# Patient Record
Sex: MALE | Race: WHITE | Employment: FULL TIME | URBAN - METROPOLITAN AREA
[De-identification: names, ages, dates, MRNs, and addresses within clinical notes are randomized per-mention and may not be internally consistent; named-entity substitution may affect disease eponyms.]

---

## 2017-02-07 ENCOUNTER — HOSPITAL ENCOUNTER (EMERGENCY)
Age: 37
Discharge: HOME | End: 2017-02-07
Attending: EMERGENCY MEDICINE
Payer: COMMERCIAL

## 2017-02-07 VITALS
BODY MASS INDEX: 19.64 KG/M2 | DIASTOLIC BLOOD PRESSURE: 82 MMHG | RESPIRATION RATE: 16 BRPM | WEIGHT: 145 LBS | HEIGHT: 72 IN | OXYGEN SATURATION: 98 % | TEMPERATURE: 98 F | SYSTOLIC BLOOD PRESSURE: 132 MMHG | HEART RATE: 74 BPM

## 2017-02-07 DIAGNOSIS — K29.20 ALCOHOLIC GASTRITIS WITHOUT BLEEDING, UNSPECIFIED CHRONICITY: Primary | ICD-10-CM

## 2017-02-07 LAB
ALBUMIN SERPL BCP-MCNC: 4.5 G/DL (ref 3.5–5)
ALBUMIN/GLOB SERPL: 1.3 {RATIO} (ref 1.2–3.5)
ALP SERPL-CCNC: 88 U/L (ref 50–136)
ALT SERPL-CCNC: 157 U/L (ref 12–65)
ANION GAP BLD CALC-SCNC: 25 MMOL/L (ref 7–16)
AST SERPL W P-5'-P-CCNC: 186 U/L (ref 15–37)
BASOPHILS # BLD AUTO: 0.1 K/UL (ref 0–0.2)
BASOPHILS # BLD: 1 % (ref 0–2)
BILIRUB SERPL-MCNC: 1.3 MG/DL (ref 0.2–1.1)
BUN SERPL-MCNC: 8 MG/DL (ref 6–23)
CALCIUM SERPL-MCNC: 8.9 MG/DL (ref 8.3–10.4)
CHLORIDE SERPL-SCNC: 96 MMOL/L (ref 98–107)
CO2 SERPL-SCNC: 16 MMOL/L (ref 21–32)
CREAT SERPL-MCNC: 0.76 MG/DL (ref 0.8–1.5)
DIFFERENTIAL METHOD BLD: ABNORMAL
EOSINOPHIL # BLD: 0.2 K/UL (ref 0–0.8)
EOSINOPHIL NFR BLD: 5 % (ref 0.5–7.8)
ERYTHROCYTE [DISTWIDTH] IN BLOOD BY AUTOMATED COUNT: 13.3 % (ref 11.9–14.6)
ETHANOL SERPL-MCNC: 244 MG/DL
GLOBULIN SER CALC-MCNC: 3.5 G/DL
GLUCOSE SERPL-MCNC: 83 MG/DL (ref 65–100)
HCT VFR BLD AUTO: 46.4 % (ref 41.1–50.3)
HGB BLD-MCNC: 17 G/DL (ref 13.6–17.2)
IMM GRANULOCYTES # BLD: 0 K/UL (ref 0–0.5)
IMM GRANULOCYTES NFR BLD AUTO: 0.5 % (ref 0–5)
LIPASE SERPL-CCNC: 224 U/L (ref 73–393)
LYMPHOCYTES # BLD AUTO: 36 % (ref 13–44)
LYMPHOCYTES # BLD: 1.3 K/UL (ref 0.5–4.6)
MAGNESIUM SERPL-MCNC: 2.1 MG/DL (ref 1.8–2.4)
MCH RBC QN AUTO: 36.8 PG (ref 26.1–32.9)
MCHC RBC AUTO-ENTMCNC: 36.6 G/DL (ref 31.4–35)
MCV RBC AUTO: 100.4 FL (ref 79.6–97.8)
MONOCYTES # BLD: 0.9 K/UL (ref 0.1–1.3)
MONOCYTES NFR BLD AUTO: 24 % (ref 4–12)
NEUTS SEG # BLD: 1.3 K/UL (ref 1.7–8.2)
NEUTS SEG NFR BLD AUTO: 34 % (ref 43–78)
PLATELET # BLD AUTO: 131 K/UL (ref 150–450)
PMV BLD AUTO: 9.5 FL (ref 10.8–14.1)
POTASSIUM SERPL-SCNC: 3.6 MMOL/L (ref 3.5–5.1)
PROT SERPL-MCNC: 8 G/DL (ref 6.3–8.2)
RBC # BLD AUTO: 4.62 M/UL (ref 4.23–5.67)
SODIUM SERPL-SCNC: 137 MMOL/L (ref 136–145)
WBC # BLD AUTO: 3.7 K/UL (ref 4.3–11.1)

## 2017-02-07 PROCEDURE — 99284 EMERGENCY DEPT VISIT MOD MDM: CPT | Performed by: EMERGENCY MEDICINE

## 2017-02-07 PROCEDURE — 96361 HYDRATE IV INFUSION ADD-ON: CPT | Performed by: EMERGENCY MEDICINE

## 2017-02-07 PROCEDURE — 83690 ASSAY OF LIPASE: CPT | Performed by: EMERGENCY MEDICINE

## 2017-02-07 PROCEDURE — 83735 ASSAY OF MAGNESIUM: CPT | Performed by: EMERGENCY MEDICINE

## 2017-02-07 PROCEDURE — 80053 COMPREHEN METABOLIC PANEL: CPT | Performed by: EMERGENCY MEDICINE

## 2017-02-07 PROCEDURE — 80307 DRUG TEST PRSMV CHEM ANLYZR: CPT | Performed by: EMERGENCY MEDICINE

## 2017-02-07 PROCEDURE — 74011000250 HC RX REV CODE- 250: Performed by: EMERGENCY MEDICINE

## 2017-02-07 PROCEDURE — 85025 COMPLETE CBC W/AUTO DIFF WBC: CPT | Performed by: EMERGENCY MEDICINE

## 2017-02-07 PROCEDURE — 96376 TX/PRO/DX INJ SAME DRUG ADON: CPT | Performed by: EMERGENCY MEDICINE

## 2017-02-07 PROCEDURE — 74011250637 HC RX REV CODE- 250/637: Performed by: EMERGENCY MEDICINE

## 2017-02-07 PROCEDURE — 74011250636 HC RX REV CODE- 250/636: Performed by: EMERGENCY MEDICINE

## 2017-02-07 PROCEDURE — 96374 THER/PROPH/DIAG INJ IV PUSH: CPT | Performed by: EMERGENCY MEDICINE

## 2017-02-07 RX ORDER — CHLORDIAZEPOXIDE HYDROCHLORIDE 10 MG/1
10 CAPSULE, GELATIN COATED ORAL
Qty: 30 CAP | Refills: 0 | Status: SHIPPED | OUTPATIENT
Start: 2017-02-07 | End: 2018-02-05

## 2017-02-07 RX ORDER — LANSOPRAZOLE 30 MG/1
30 CAPSULE, DELAYED RELEASE ORAL DAILY
Qty: 30 CAP | Refills: 1 | Status: SHIPPED | OUTPATIENT
Start: 2017-02-07 | End: 2017-03-09

## 2017-02-07 RX ORDER — LIDOCAINE HYDROCHLORIDE 20 MG/ML
15 SOLUTION OROPHARYNGEAL
Status: COMPLETED | OUTPATIENT
Start: 2017-02-07 | End: 2017-02-07

## 2017-02-07 RX ORDER — ONDANSETRON 2 MG/ML
4 INJECTION INTRAMUSCULAR; INTRAVENOUS
Status: COMPLETED | OUTPATIENT
Start: 2017-02-07 | End: 2017-02-07

## 2017-02-07 RX ADMIN — ONDANSETRON 4 MG: 2 INJECTION, SOLUTION INTRAMUSCULAR; INTRAVENOUS at 17:30

## 2017-02-07 RX ADMIN — LIDOCAINE HYDROCHLORIDE 15 ML: 20 SOLUTION ORAL; TOPICAL at 17:40

## 2017-02-07 RX ADMIN — Medication 30 ML: at 17:40

## 2017-02-07 RX ADMIN — SODIUM CHLORIDE 1000 ML: 900 INJECTION, SOLUTION INTRAVENOUS at 15:30

## 2017-02-07 RX ADMIN — ONDANSETRON 4 MG: 2 INJECTION, SOLUTION INTRAMUSCULAR; INTRAVENOUS at 15:30

## 2017-02-07 NOTE — ED NOTES
Pt resting on stretcher and is not vomiting since second dose of Zofran. Dr. Jamie Lux has been notified at this and completing discharge paper work.

## 2017-02-07 NOTE — ED PROVIDER NOTES
HPI Comments: PT reports 5 days for abdominal pain, nausea and vomiting. Report daily EtOH use. Denies fever, hematemesis, melena or hematochezia. Describes abominal pain as crampy and burning. Denies any urinary symptoms. Does report some Headaches     Patient is a 39 y.o. male presenting with abdominal pain. The history is provided by the patient. Abdominal Pain    This is a new problem. The current episode started more than 2 days ago. The problem occurs daily. The problem has not changed since onset. The pain is associated with ETOH use. The pain is located in the generalized abdominal region. The quality of the pain is aching and cramping. The pain is at a severity of 4/10. The pain is moderate. Associated symptoms include nausea, vomiting and headaches. Pertinent negatives include no fever, no belching, no flatus, no hematochezia, no melena, no constipation, no frequency, no chest pain and no back pain. The pain is worsened by drinking alcohol. His past medical history does not include gallstones or ulcerative colitis. History reviewed. No pertinent past medical history. History reviewed. No pertinent past surgical history. History reviewed. No pertinent family history. Social History     Social History    Marital status:      Spouse name: N/A    Number of children: N/A    Years of education: N/A     Occupational History    Not on file. Social History Main Topics    Smoking status: Former Smoker     Quit date: 1/2/2006    Smokeless tobacco: Not on file    Alcohol use 20.5 oz/week     14 Glasses of wine, 21 Standard drinks or equivalent, 6 Cans of beer per week      Comment: daily 2-3     Drug use: No    Sexual activity: Not on file     Other Topics Concern    Not on file     Social History Narrative         ALLERGIES: Review of patient's allergies indicates no known allergies. Review of Systems   Constitutional: Positive for fatigue. Negative for fever.    HENT: Negative for congestion, dental problem and trouble swallowing. Eyes: Negative for photophobia, redness and visual disturbance. Respiratory: Negative for chest tightness, shortness of breath and stridor. Cardiovascular: Negative for chest pain and leg swelling. Gastrointestinal: Positive for abdominal pain, nausea and vomiting. Negative for constipation, flatus, hematochezia and melena. Endocrine: Negative for polydipsia, polyphagia and polyuria. Genitourinary: Negative for flank pain, frequency and urgency. Musculoskeletal: Negative for back pain. Skin: Negative for pallor and rash. Allergic/Immunologic: Negative for food allergies and immunocompromised state. Neurological: Positive for headaches. Negative for seizures and numbness. Hematological: Negative for adenopathy. Does not bruise/bleed easily. Psychiatric/Behavioral: Negative for behavioral problems and confusion. All other systems reviewed and are negative. Vitals:    02/07/17 1358   BP: (!) 147/103   Pulse: 100   Resp: 19   Temp: 97.4 °F (36.3 °C)   SpO2: 97%   Weight: 65.8 kg (145 lb)   Height: 6' (1.829 m)            Physical Exam   Constitutional: He is oriented to person, place, and time. He appears well-developed and well-nourished. HENT:   Head: Normocephalic and atraumatic. Mouth/Throat: Oropharynx is clear and moist.   Eyes: Conjunctivae and EOM are normal. Pupils are equal, round, and reactive to light. No scleral icterus. Neck: Normal range of motion. Neck supple. No JVD present. No tracheal deviation present. No thyromegaly present. Cardiovascular: Normal rate, regular rhythm and intact distal pulses. Exam reveals no friction rub. No murmur heard. Pulmonary/Chest: Effort normal and breath sounds normal. No respiratory distress. He has no wheezes. Abdominal: Soft. He exhibits no distension. There is no tenderness. Musculoskeletal: Normal range of motion.  He exhibits no edema, tenderness or deformity. Neurological: He is alert and oriented to person, place, and time. He has normal reflexes. No cranial nerve deficit. Nursing note and vitals reviewed. MDM  Number of Diagnoses or Management Options  Diagnosis management comments: DDx: Pancreatitis, Alcoholic Gastritis, biliary Colic, Hepatitis. 4:50 PM  LFTS Mildly elevated, normal lipase and alk phos. Symptomatically improved. Social work discussing with patient treatment options for alcoholism       Amount and/or Complexity of Data Reviewed  Clinical lab tests: ordered and reviewed    Risk of Complications, Morbidity, and/or Mortality  Presenting problems: moderate  Diagnostic procedures: low  Management options: moderate    Patient Progress  Patient progress: stable    ED Course       Procedures             Results Include:    Recent Results (from the past 24 hour(s))   CBC WITH AUTOMATED DIFF    Collection Time: 02/07/17  2:48 PM   Result Value Ref Range    WBC 3.7 (L) 4.3 - 11.1 K/uL    RBC 4.62 4.23 - 5.67 M/uL    HGB 17.0 13.6 - 17.2 g/dL    HCT 46.4 41.1 - 50.3 %    .4 (H) 79.6 - 97.8 FL    MCH 36.8 (H) 26.1 - 32.9 PG    MCHC 36.6 (H) 31.4 - 35.0 g/dL    RDW 13.3 11.9 - 14.6 %    PLATELET 801 (L) 228 - 450 K/uL    MPV 9.5 (L) 10.8 - 14.1 FL    DF AUTOMATED      NEUTROPHILS 34 (L) 43 - 78 %    LYMPHOCYTES 36 13 - 44 %    MONOCYTES 24 (H) 4.0 - 12.0 %    EOSINOPHILS 5 0.5 - 7.8 %    BASOPHILS 1 0.0 - 2.0 %    IMMATURE GRANULOCYTES 0.5 0.0 - 5.0 %    ABS. NEUTROPHILS 1.3 (L) 1.7 - 8.2 K/UL    ABS. LYMPHOCYTES 1.3 0.5 - 4.6 K/UL    ABS. MONOCYTES 0.9 0.1 - 1.3 K/UL    ABS. EOSINOPHILS 0.2 0.0 - 0.8 K/UL    ABS. BASOPHILS 0.1 0.0 - 0.2 K/UL    ABS. IMM.  GRANS. 0.0 0.0 - 0.5 K/UL   METABOLIC PANEL, COMPREHENSIVE    Collection Time: 02/07/17  2:48 PM   Result Value Ref Range    Sodium 137 136 - 145 mmol/L    Potassium 3.6 3.5 - 5.1 mmol/L    Chloride 96 (L) 98 - 107 mmol/L    CO2 16 (L) 21 - 32 mmol/L    Anion gap 25 (H) 7 - 16 mmol/L    Glucose 83 65 - 100 mg/dL    BUN 8 6 - 23 MG/DL    Creatinine 0.76 (L) 0.8 - 1.5 MG/DL    GFR est AA >60 >60 ml/min/1.73m2    GFR est non-AA >60 >60 ml/min/1.73m2    Calcium 8.9 8.3 - 10.4 MG/DL    Bilirubin, total 1.3 (H) 0.2 - 1.1 MG/DL    ALT (SGPT) 157 (H) 12 - 65 U/L    AST (SGOT) 186 (H) 15 - 37 U/L    Alk.  phosphatase 88 50 - 136 U/L    Protein, total 8.0 6.3 - 8.2 g/dL    Albumin 4.5 3.5 - 5.0 g/dL    Globulin 3.5 g/dL    A-G Ratio 1.3 1.2 - 3.5     LIPASE    Collection Time: 02/07/17  2:48 PM   Result Value Ref Range    Lipase 224 73 - 393 U/L   ETHYL ALCOHOL    Collection Time: 02/07/17  2:48 PM   Result Value Ref Range    ALCOHOL(ETHYL),SERUM 244 MG/DL   MAGNESIUM    Collection Time: 02/07/17  2:48 PM   Result Value Ref Range    Magnesium 2.1 1.8 - 2.4 mg/dL

## 2017-02-07 NOTE — ED TRIAGE NOTES
Pt in c/o abdominal pain with vomiting since Sunday. Pt states he had the flu last week and is still not feeling well. Pt describes pain at \"bloated\". Denies diarrhea or fever.

## 2017-02-07 NOTE — DISCHARGE INSTRUCTIONS
Alcohol and Drug Problems: Care Instructions  Your Care Instructions  You can improve your life and health by stopping your use of alcohol or drugs. Ending dependency on alcohol or drugs may help you feel and sleep better. You may get along better with your family, friends, and coworkers. There are medicines and programs that can help. Follow-up care is a key part of your treatment and safety. Be sure to make and go to all appointments, and call your doctor if you are having problems. It's also a good idea to know your test results and keep a list of the medicines you take. How can you care for yourself at home? · If you have been given medicine to help keep you sober or reduce your cravings, be sure to take it as prescribed. · Talk to your doctor about programs that can help you stop using drugs or drinking alcohol. · If your doctor prescribes disulfiram (Antabuse), do not drink any alcohol while you are taking this medicine. You may have severe, even life-threatening, side effects from even small amounts of alcohol. · Do not tempt yourself by keeping alcohol or drugs in your home. · Learn how to say no when other people drink or use drugs. Or don't spend time with people who drink or use drugs. · Use the time and money spent on drinking or drugs to do something fun with your family or friends. Preventing a relapse  · Do not drink alcohol or use drugs at all. Using any amount of alcohol or drugs greatly increases your risk for relapse. · Seek help from organizations such as Alcoholics Anonymous, Narcotics Anonymous, or treatment facilities if you feel the need to drink alcohol or use drugs again. · Remember that recovery is a lifelong process. · Stay away from situations, friends, or places that may lead you to drink or use drugs. · Have a plan to spot and deal with relapse. Learn to recognize changes in your thinking that lead you to drink or use drugs. These are warning signs.  Get help before you start to drink or use drugs again. · Get help as soon as you can if you relapse. Some people make a plan with another person that outlines what they want that person to do for them if they relapse. The plan usually includes how to handle the relapse and who to notify in case of relapse. · Don't give up. Remember that a relapse does not mean that you have failed. Use the experience to learn the triggers that lead you to drink or use drugs. Then quit again. Many people have several relapses before they are able to quit for good. When should you call for help? Call 911 anytime you think you may need emergency care. For example, call if:  · You feel you cannot stop from hurting yourself or someone else. Call your doctor now or seek immediate medical care if:  · You have serious withdrawal symptoms such as confusion, hallucinations, or severe trembling. Watch closely for changes in your health, and be sure to contact your doctor if:  · You have a relapse. · You need more help or support to stop. Where can you learn more? Go to http://estephania-daron.info/. Enter [id number] in the search box to learn more about \"Alcohol and Drug Problems: Care Instructions. \"  Current as of: February 24, 2016  Content Version: 11.1  © 3144-7674 [employer]. Care instructions adapted under license by [employer] (which disclaims liability or warranty for this information). If you have questions about a medical condition or this instruction, always ask your healthcare professional. [place] [address] any warranty or liability for your use of this information. Gastritis: Care Instructions  Your Care Instructions    Gastritis is a sore and upset stomach. It happens when something irritates the stomach lining. Many things can cause it. These include an infection such as the flu or something you ate or drank.  Medicines or a sore on the lining of the stomach (ulcer) also can cause it. Your belly may bloat and ache. You may belch, vomit, and feel sick to your stomach. You should be able to relieve the problem by taking medicine. And it may help to change your diet. If gastritis lasts, your doctor may prescribe medicine. Follow-up care is a key part of your treatment and safety. Be sure to make and go to all appointments, and call your doctor if you are having problems. It's also a good idea to know your test results and keep a list of the medicines you take. How can you care for yourself at home? · If your doctor prescribed antibiotics, take them as directed. Do not stop taking them just because you feel better. You need to take the full course of antibiotics. · Be safe with medicines. If your doctor prescribed medicine to decrease stomach acid, take it as directed. Call your doctor if you think you are having a problem with your medicine. · Do not take any other medicine, including over-the-counter pain relievers, without talking to your doctor first.  · If your doctor recommends over-the-counter medicine to reduce stomach acid, such as Pepcid AC, Prilosec, Tagamet HB, or Zantac 75, follow the directions on the label. · Drink plenty of fluids (enough so that your urine is light yellow or clear like water) to prevent dehydration. Choose water and other caffeine-free clear liquids. If you have kidney, heart, or liver disease and have to limit fluids, talk with your doctor before you increase the amount of fluids you drink. · Limit how much alcohol you drink. · Avoid coffee, tea, cola drinks, chocolate, and other foods with caffeine. They increase stomach acid. When should you call for help? Call 911 anytime you think you may need emergency care. For example, call if:  · You vomit blood or what looks like coffee grounds. · You pass maroon or very bloody stools.   Call your doctor now or seek immediate medical care if:  · You start breathing fast and have not produced urine in the last 8 hours. · You cannot keep fluids down. Watch closely for changes in your health, and be sure to contact your doctor if:  · You do not get better as expected. Where can you learn more? Go to http://estephania-daron.info/. Enter [id number] in the search box to learn more about \"Gastritis: Care Instructions. \"  Current as of: August 9, 2016  Content Version: 11.1  © 3274-5608 [employer]. Care instructions adapted under license by [employer] (which disclaims liability or warranty for this information). If you have questions about a medical condition or this instruction, always ask your healthcare professional. [address] any warranty or liability for your use of this information.

## 2017-02-07 NOTE — PROGRESS NOTES
Visited with patient at request of Dr Marily Agudelo to assist with help with Alcoholism. Father, Kenneth Garcia ([phone]), is at the bedside and appears to be extremely supportive. Patient states he drinks about 2 bottles of wine a day and has had a problem with ETOH for the last 5 yrs. He has never sought treatment or had any counseling for it. States he wants to get sober. Works as a  so he does not want to go inpatient. States [facility] and was going to discuss options. I have reviewed patient's outpatient options with he and his father. I have also gone over some inpatient options incase he changes his mind. Patient given resources for [facility] inpatient and outpatient, [facility] inpatient and outpatient and [facility]. I have encouraged them to call [facility] today and ask for a  so patient can get started on his road to recovery. Patient and dad verbalize understanding and I have given them my contact information should they need any further assistance.

## 2017-03-07 ENCOUNTER — APPOINTMENT (OUTPATIENT)
Dept: GENERAL RADIOLOGY | Age: 37
End: 2017-03-07
Attending: EMERGENCY MEDICINE
Payer: SELF-PAY

## 2017-03-07 VITALS
SYSTOLIC BLOOD PRESSURE: 139 MMHG | DIASTOLIC BLOOD PRESSURE: 95 MMHG | OXYGEN SATURATION: 99 % | HEART RATE: 103 BPM | WEIGHT: 150 LBS | TEMPERATURE: 98 F | HEIGHT: 72 IN | BODY MASS INDEX: 20.32 KG/M2 | RESPIRATION RATE: 20 BRPM

## 2017-03-07 PROCEDURE — 75810000301 HC ER LEVEL 1 CLOSED TREATMNT FRACTURE/DISLOCATION: Performed by: EMERGENCY MEDICINE

## 2017-03-07 PROCEDURE — 73130 X-RAY EXAM OF HAND: CPT

## 2017-03-07 PROCEDURE — 77030002986 HC SUT PROL J&J -A

## 2017-03-07 PROCEDURE — 77030018836 HC SOL IRR NACL ICUM -A

## 2017-03-07 PROCEDURE — 75810000053 HC SPLINT APPLICATION: Performed by: EMERGENCY MEDICINE

## 2017-03-07 PROCEDURE — 99283 EMERGENCY DEPT VISIT LOW MDM: CPT | Performed by: EMERGENCY MEDICINE

## 2017-03-07 PROCEDURE — 75810000283 HC INJECTION NERVE BLOCK: Performed by: EMERGENCY MEDICINE

## 2017-03-07 PROCEDURE — 75810000293 HC SIMP/SUPERF WND  RPR: Performed by: EMERGENCY MEDICINE

## 2017-03-08 ENCOUNTER — HOSPITAL ENCOUNTER (EMERGENCY)
Age: 37
Discharge: HOME | End: 2017-03-08
Attending: EMERGENCY MEDICINE
Payer: SELF-PAY

## 2017-03-08 DIAGNOSIS — S63.259A FINGER DISLOCATION, INITIAL ENCOUNTER: Primary | ICD-10-CM

## 2017-03-08 DIAGNOSIS — S61.219A LACERATION OF FINGER, INITIAL ENCOUNTER: ICD-10-CM

## 2017-03-08 PROCEDURE — 75810000301 HC ER LEVEL 1 CLOSED TREATMNT FRACTURE/DISLOCATION: Performed by: EMERGENCY MEDICINE

## 2017-03-08 RX ORDER — CEPHALEXIN 500 MG/1
500 CAPSULE ORAL 4 TIMES DAILY
Qty: 28 CAP | Refills: 0 | Status: SHIPPED | OUTPATIENT
Start: 2017-03-08 | End: 2017-03-15

## 2017-03-08 NOTE — ED PROVIDER NOTES
HPI Comments: Patient is a 14-year-old male with history of heavy drinking and he states he just got out of rehabilitation several days ago and has been drinking heavily. He reports yesterday evening he fell hitting his right ring finger. He noted a deformity and laceration placed a band aid and continued drinking alcohol. Patient is a 39 y.o. male presenting with finger pain. The history is provided by the patient. No  was used. Finger Pain    Pertinent negatives include no back pain. No past medical history on file. No past surgical history on file. No family history on file. Social History     Social History    Marital status:      Spouse name: N/A    Number of children: N/A    Years of education: N/A     Occupational History    Not on file. Social History Main Topics    Smoking status: Former Smoker     Quit date: 1/2/2006    Smokeless tobacco: Not on file    Alcohol use 20.5 oz/week     14 Glasses of wine, 21 Standard drinks or equivalent, 6 Cans of beer per week      Comment: daily 2-3     Drug use: No    Sexual activity: Not on file     Other Topics Concern    Not on file     Social History Narrative         ALLERGIES: Review of patient's allergies indicates no known allergies. Review of Systems   Constitutional: Negative for fatigue and fever. HENT: Negative for sore throat. Eyes: Negative for pain. Respiratory: Negative for cough, chest tightness and shortness of breath. Cardiovascular: Negative for leg swelling. Gastrointestinal: Negative for abdominal pain. Genitourinary: Negative for dysuria. Musculoskeletal: Negative for back pain. Left 4th finger pain   Neurological: Negative for syncope and headaches.        Vitals:    03/07/17 2311   BP: (!) 139/95   Pulse: (!) 103   Resp: 20   Temp: 98 °F (36.7 °C)   SpO2: 99%   Weight: 68 kg (150 lb)   Height: 6' (1.829 m)            Physical Exam   Constitutional: He is oriented to person, place, and time. He appears well-developed and well-nourished. No distress. HENT:   Head: Normocephalic and atraumatic. Eyes: Conjunctivae and EOM are normal. Pupils are equal, round, and reactive to light. Neck: Normal range of motion. Neck supple. Cardiovascular: Normal rate, regular rhythm and normal heart sounds. Pulmonary/Chest: Effort normal and breath sounds normal. No respiratory distress. He has no wheezes. He has no rales. Abdominal: Soft. He exhibits no distension. There is no tenderness. There is no rebound. Musculoskeletal: Normal range of motion. He exhibits tenderness. He exhibits no edema. Hands:  Neurological: He is alert and oriented to person, place, and time. Skin: Skin is warm and dry. No rash noted. He is not diaphoretic. Psychiatric: He has a normal mood and affect. His behavior is normal.   Vitals reviewed. MDM  Number of Diagnoses or Management Options  Finger dislocation, initial encounter: new and does not require workup  Laceration of finger, initial encounter: new and does not require workup  Diagnosis management comments: Finger reduced successfully after ring block. Laceration sutured. We'll discharge the patient with Keflex and instructed him to return in one week for suture removal. Near his laceration there is some skin that looks like it was not receiving proper blood flow for quite some time. I discussed this skin may turn black but should heal overall. Stressed the importance of returning to have sutures removed and wound check. I also discussed that not drinking excessive amounts of alcohol be very beneficial for his health. Amount and/or Complexity of Data Reviewed  Tests in the radiology section of CPT®: ordered and reviewed (Xr Hand Rt Min 3 V    Result Date: 3/7/2017  EXAM:  XR HAND RT MIN 3 V INDICATION:  injury COMPARISON: None.  FINDINGS: 3  views of the right hand demonstrate dislocation of fourth PIP joint without evidence of a fracture. Small amount of soft tissue air is present. .       IMPRESSION: Dislocation of the fourth PIP joint without evidence of a fracture.       )  Review and summarize past medical records: yes  Independent visualization of images, tracings, or specimens: yes    Risk of Complications, Morbidity, and/or Mortality  Presenting problems: high  Diagnostic procedures: moderate  Management options: moderate    Patient Progress  Patient progress: improved    ED Course       Wound Repair  Date/Time: 3/7/2017 11:20 AM  Performed by: attendingSupervising provider: Tasha Talamantes  Preparation: skin prepped with Betadine  Time out: Immediately prior to the procedure a time out was called to verify the correct patient, procedure, equipment, staff and marking as appropriate. .  Location: right fourth finger. Wound length:2.6 - 7.5 cm  Anesthesia: digital block    Anesthesia:  Anesthesia: digital block  Local Anesthetic: lidocaine 1% without epinephrine   Foreign bodies: no foreign bodies  Irrigation solution: saline  Debridement: none  Wound skin closure material used: 5-0 Prolene. Number of sutures: 4  Technique: simple  Approximation: close  Dressing: splint and pressure dressing  Patient tolerance: Patient tolerated the procedure well with no immediate complications  My total time at bedside, performing this procedure was 16-30 minutes. Reduction of Joint  Date/Time: 3/8/2017 7:18 AM  Performed by: Carlos Del Rosario  Authorized by: Carlos Del Rosario     Consent:     Consent obtained:  Verbal    Consent given by:  Patient    Risks discussed:  Nerve damage and pain    Alternatives discussed:  No treatment  Injury:     Injury location: right fourth finger. Pre-procedure assessment:     Neurological function: normal      Distal perfusion: normal      Range of motion: reduced    Anesthesia (see MAR for exact dosages):      Anesthesia method:  None  Procedure details:     Manipulation performed: yes      Skin traction used: no      Immobilization:  Splint  Post-procedure assessment:     Neurological function: normal      Distal perfusion: normal      Range of motion: normal      Patient tolerance of procedure:   Tolerated well, no immediate complications

## 2017-03-08 NOTE — LETTER
[address] [facility] EMERGENCY DEPT 
[facility] [address] [place] [id number] 
[phone] Work/School Note Date: 3/7/2017 To Whom It May concern: 
 
Prince Parker was seen and treated today in the emergency room by the following provider(s): 
Attending Provider: Isabel Khan MD. Dipesh Bakari needs 3/8/2017 off as she was with patient in the er Sincerely, Sonia Salguero RN

## 2017-03-08 NOTE — DISCHARGE INSTRUCTIONS
Dislocated Finger: Care Instructions  Your Care Instructions  When the bones of a finger are forced out of their normal position, it is called a dislocated finger. This can happen when a finger jams or bends backward. It is common during sports. A doctor can put your finger back in its normal position. You probably knew that something was wrong with your finger right away. This is because a dislocated finger usually hurts a lot. And it doesn't look straight. Your doctor may have put a splint on the finger. This will keep it in position while it heals. Your doctor may also recommend exercises to strengthen your finger. Rest and home treatment can also help you get better. If you damaged bones or muscles, you may need more treatment. Follow-up care is a key part of your treatment and safety. Be sure to make and go to all appointments, and call your doctor if you are having problems. It's also a good idea to know your test results and keep a list of the medicines you take. How can you care for yourself at home? · If your doctor put a splint on your finger, wear it as directed. Do not remove it until your doctor says you can. · Do not do anything that makes the pain worse. · If your finger is swollen, put ice or a cold pack on it for 10 to 20 minutes at a time. Try to do this every 1 to 2 hours for the next 3 days (when you are awake) or until the swelling goes down. Put a thin cloth between the ice and your skin. · Prop up your hand on a pillow when you ice it or anytime you sit or lie down during the next 3 days. Try to keep it above the level of your heart. This will help reduce swelling. · Take your medicines exactly as prescribed. Call your doctor if you think you are having a problem with your medicine. · Ask your doctor if you can take an over-the-counter pain medicine, such as acetaminophen (Tylenol), ibuprofen (Advil, Motrin), or naproxen (Aleve). Be safe with medicines.  Read and follow all instructions on the label. · If your doctor recommends exercises, do them as directed. When should you call for help? Call your doctor now or seek immediate medical care if:  · You have signs that your finger may be dislocated again, such as:  ¨ Severe pain. ¨ A finger that looks like a bone is out of position. ¨ Not being able to bend or straighten your finger. · Your finger or hand is cool or pale or changes color. · You cannot feel or move your finger. Watch closely for changes in your health, and be sure to contact your doctor if:  · You do not get better as expected. Where can you learn more? Go to http://estephania-daron.info/. Enter R117 in the search box to learn more about \"Dislocated Finger: Care Instructions. \"  Current as of: May 23, 2016  Content Version: 11.1  © 0834-4409 [employer]. Care instructions adapted under license by [employer] (which disclaims liability or warranty for this information). If you have questions about a medical condition or this instruction, always ask your healthcare professional. [address] any warranty or liability for your use of this information.

## 2018-02-05 ENCOUNTER — HOSPITAL ENCOUNTER (EMERGENCY)
Age: 38
Discharge: HOME | End: 2018-02-05
Attending: EMERGENCY MEDICINE
Payer: SELF-PAY

## 2018-02-05 VITALS
SYSTOLIC BLOOD PRESSURE: 130 MMHG | BODY MASS INDEX: 18.96 KG/M2 | TEMPERATURE: 97.8 F | WEIGHT: 140 LBS | DIASTOLIC BLOOD PRESSURE: 84 MMHG | HEART RATE: 90 BPM | OXYGEN SATURATION: 96 % | HEIGHT: 72 IN | RESPIRATION RATE: 16 BRPM

## 2018-02-05 DIAGNOSIS — F10.920 ALCOHOLIC INTOXICATION WITHOUT COMPLICATION (HCC): Primary | ICD-10-CM

## 2018-02-05 PROCEDURE — 99283 EMERGENCY DEPT VISIT LOW MDM: CPT | Performed by: NURSE PRACTITIONER

## 2018-02-05 RX ORDER — ONDANSETRON 4 MG/1
4 TABLET, ORALLY DISINTEGRATING ORAL
Status: DISCONTINUED | OUTPATIENT
Start: 2018-02-05 | End: 2018-02-05 | Stop reason: HOSPADM

## 2018-02-05 NOTE — ED PROVIDER NOTES
HPI Comments: Patient states he left rehab for alcohol 1 week ago. He states he has not had anything to eat since he completed the program. He states he does not eat when he drinks alcohol. He states he has been drinking since he was discharged. He states he is here today for IV fluids. He denies pain, nausea, and vomiting. He is alert and oriented. He is answering questions appropriately. He is ambulating around the department without difficulty. Patient is a 40 y.o. male presenting with intoxication. The history is provided by the patient. Alcohol intoxication   Primary symptoms include: intoxication. There areno confusion, no somnolence, no loss of consciousness, no seizures, no weakness, no agitation, no delusions, no hallucinations, no self-injury and no violence present at this time. This is a chronic problem. The current episode started more than 1 week ago. The problem has not changed since onset. Suspected agents include alcohol. Pertinent negatives include no fever, no injury, no nausea, no vomiting, no bladder incontinence and no bowel incontinence. History reviewed. No pertinent past medical history. History reviewed. No pertinent surgical history. History reviewed. No pertinent family history. Social History     Social History    Marital status:      Spouse name: N/A    Number of children: N/A    Years of education: N/A     Occupational History    Not on file. Social History Main Topics    Smoking status: Former Smoker     Quit date: 1/2/2006    Smokeless tobacco: Not on file    Alcohol use 20.5 oz/week     14 Glasses of wine, 21 Standard drinks or equivalent, 6 Cans of beer per week      Comment: daily 2-3     Drug use: No    Sexual activity: Not on file     Other Topics Concern    Not on file     Social History Narrative         ALLERGIES: Review of patient's allergies indicates no known allergies. Review of Systems   Constitutional: Negative for fever. Respiratory: Negative for cough and shortness of breath. Gastrointestinal: Negative for bowel incontinence, nausea and vomiting. Genitourinary: Negative for bladder incontinence. Musculoskeletal: Negative for arthralgias and myalgias. Neurological: Negative for dizziness, seizures, loss of consciousness and weakness. Psychiatric/Behavioral: Negative for agitation, confusion, hallucinations and self-injury. Vitals:    02/05/18 1112 02/05/18 1442   BP: 134/84 130/84   Pulse: (!) 105 90   Resp: 20 16   Temp: 97.8 °F (36.6 °C)    SpO2: 95% 96%   Weight: 63.5 kg (140 lb)    Height: 6' (1.829 m)             Physical Exam   Constitutional: He is oriented to person, place, and time. No distress. Cardiovascular: Normal rate and regular rhythm. No murmur heard. Pulmonary/Chest: Effort normal and breath sounds normal. No respiratory distress. He has no wheezes. Abdominal: Soft. Bowel sounds are normal. He exhibits no distension. There is no tenderness. Musculoskeletal: Normal range of motion. Neurological: He is alert and oriented to person, place, and time. No cranial nerve deficit or sensory deficit. GCS eye subscore is 4. GCS verbal subscore is 5. GCS motor subscore is 6. Skin: Skin is warm and dry. He is not diaphoretic. Psychiatric: He has a normal mood and affect. His speech is normal and behavior is normal. He expresses no homicidal and no suicidal ideation. Nursing note and vitals reviewed. MDM  Number of Diagnoses or Management Options  Alcoholic intoxication without complication [facility]):   Diagnosis management comments: Patient refused zofran. No IV fluids given due to patient not vomiting and alert and oriented. I encouraged patient to replace alcohol with water intake.         Amount and/or Complexity of Data Reviewed  Tests in the medicine section of CPT®: ordered  Discuss the patient with other providers: yes (Anna Vallejo  )    Patient Progress  Patient progress: stable        ED Course       Procedures

## 2018-02-05 NOTE — ED TRIAGE NOTES
Patient states \"I just got out of rehab and I'm dehydrated and I'm drunk. \" States drank two bottles of wine since midnight.

## 2018-02-14 ENCOUNTER — HOSPITAL ENCOUNTER (EMERGENCY)
Age: 38
Discharge: HOME | End: 2018-02-14
Attending: EMERGENCY MEDICINE
Payer: SELF-PAY

## 2018-02-14 VITALS
OXYGEN SATURATION: 100 % | SYSTOLIC BLOOD PRESSURE: 124 MMHG | BODY MASS INDEX: 18.96 KG/M2 | HEART RATE: 100 BPM | TEMPERATURE: 98.4 F | DIASTOLIC BLOOD PRESSURE: 72 MMHG | RESPIRATION RATE: 16 BRPM | WEIGHT: 140 LBS | HEIGHT: 72 IN

## 2018-02-14 DIAGNOSIS — F10.920 ALCOHOLIC INTOXICATION WITHOUT COMPLICATION (HCC): Primary | ICD-10-CM

## 2018-02-14 LAB
ALBUMIN SERPL-MCNC: 3.7 G/DL (ref 3.5–5)
ALBUMIN/GLOB SERPL: 1.1 {RATIO} (ref 1.2–3.5)
ALP SERPL-CCNC: 59 U/L (ref 50–136)
ALT SERPL-CCNC: 34 U/L (ref 12–65)
ANION GAP SERPL CALC-SCNC: 21 MMOL/L (ref 7–16)
AST SERPL-CCNC: 54 U/L (ref 15–37)
BASOPHILS # BLD: 0 K/UL (ref 0–0.2)
BASOPHILS NFR BLD: 0 % (ref 0–2)
BILIRUB SERPL-MCNC: 0.9 MG/DL (ref 0.2–1.1)
BUN SERPL-MCNC: 12 MG/DL (ref 6–23)
CALCIUM SERPL-MCNC: 8.3 MG/DL (ref 8.3–10.4)
CHLORIDE SERPL-SCNC: 96 MMOL/L (ref 98–107)
CO2 SERPL-SCNC: 24 MMOL/L (ref 21–32)
CREAT SERPL-MCNC: 0.79 MG/DL (ref 0.8–1.5)
DIFFERENTIAL METHOD BLD: ABNORMAL
EOSINOPHIL # BLD: 0 K/UL (ref 0–0.8)
EOSINOPHIL NFR BLD: 1 % (ref 0.5–7.8)
ERYTHROCYTE [DISTWIDTH] IN BLOOD BY AUTOMATED COUNT: 14.1 % (ref 11.9–14.6)
GLOBULIN SER CALC-MCNC: 3.3 G/DL (ref 2.3–3.5)
GLUCOSE SERPL-MCNC: 100 MG/DL (ref 65–100)
HCT VFR BLD AUTO: 45 % (ref 41.1–50.3)
HGB BLD-MCNC: 15.9 G/DL (ref 13.6–17.2)
IMM GRANULOCYTES # BLD: 0 K/UL (ref 0–0.5)
IMM GRANULOCYTES NFR BLD AUTO: 1 % (ref 0–5)
LIPASE SERPL-CCNC: 206 U/L (ref 73–393)
LYMPHOCYTES # BLD: 1.6 K/UL (ref 0.5–4.6)
LYMPHOCYTES NFR BLD: 24 % (ref 13–44)
MAGNESIUM SERPL-MCNC: 2 MG/DL (ref 1.8–2.4)
MCH RBC QN AUTO: 33.3 PG (ref 26.1–32.9)
MCHC RBC AUTO-ENTMCNC: 35.3 G/DL (ref 31.4–35)
MCV RBC AUTO: 94.3 FL (ref 79.6–97.8)
MONOCYTES # BLD: 0.9 K/UL (ref 0.1–1.3)
MONOCYTES NFR BLD: 15 % (ref 4–12)
NEUTS SEG # BLD: 3.9 K/UL (ref 1.7–8.2)
NEUTS SEG NFR BLD: 59 % (ref 43–78)
PLATELET # BLD AUTO: 78 K/UL (ref 150–450)
PMV BLD AUTO: 10.1 FL (ref 10.8–14.1)
POTASSIUM SERPL-SCNC: 4.3 MMOL/L (ref 3.5–5.1)
PROT SERPL-MCNC: 7 G/DL (ref 6.3–8.2)
RBC # BLD AUTO: 4.77 M/UL (ref 4.23–5.67)
SODIUM SERPL-SCNC: 141 MMOL/L (ref 136–145)
WBC # BLD AUTO: 6.5 K/UL (ref 4.3–11.1)

## 2018-02-14 PROCEDURE — 96374 THER/PROPH/DIAG INJ IV PUSH: CPT | Performed by: NURSE PRACTITIONER

## 2018-02-14 PROCEDURE — 99284 EMERGENCY DEPT VISIT MOD MDM: CPT | Performed by: NURSE PRACTITIONER

## 2018-02-14 PROCEDURE — 80053 COMPREHEN METABOLIC PANEL: CPT | Performed by: NURSE PRACTITIONER

## 2018-02-14 PROCEDURE — 85025 COMPLETE CBC W/AUTO DIFF WBC: CPT | Performed by: NURSE PRACTITIONER

## 2018-02-14 PROCEDURE — 96361 HYDRATE IV INFUSION ADD-ON: CPT | Performed by: NURSE PRACTITIONER

## 2018-02-14 PROCEDURE — 83735 ASSAY OF MAGNESIUM: CPT | Performed by: NURSE PRACTITIONER

## 2018-02-14 PROCEDURE — 83690 ASSAY OF LIPASE: CPT | Performed by: NURSE PRACTITIONER

## 2018-02-14 PROCEDURE — 74011250636 HC RX REV CODE- 250/636: Performed by: NURSE PRACTITIONER

## 2018-02-14 RX ORDER — ONDANSETRON 2 MG/ML
4 INJECTION INTRAMUSCULAR; INTRAVENOUS
Status: COMPLETED | OUTPATIENT
Start: 2018-02-14 | End: 2018-02-14

## 2018-02-14 RX ADMIN — ONDANSETRON 4 MG: 2 INJECTION INTRAMUSCULAR; INTRAVENOUS at 14:45

## 2018-02-14 RX ADMIN — SODIUM CHLORIDE 1000 ML: 900 INJECTION, SOLUTION INTRAVENOUS at 14:45

## 2018-02-14 NOTE — ED NOTES
I have reviewed discharge instructions with the patient. The patient verbalized understanding. Patient left ED via Discharge Method: ambulatory to Home with self. Opportunity for questions and clarification provided. Patient given 0 scripts. To continue your aftercare when you leave the hospital, you may receive an automated call from our care team to check in on how you are doing. This is a free service and part of our promise to provide the best care and service to meet your aftercare needs.  If you have questions, or wish to unsubscribe from this service please call [phone]. Thank you for Choosing our [facility] Emergency Department.

## 2018-04-20 ENCOUNTER — HOSPITAL ENCOUNTER (EMERGENCY)
Age: 38
Discharge: HOME | End: 2018-04-20
Attending: EMERGENCY MEDICINE
Payer: SELF-PAY

## 2018-04-20 VITALS
HEART RATE: 95 BPM | WEIGHT: 140 LBS | SYSTOLIC BLOOD PRESSURE: 150 MMHG | RESPIRATION RATE: 12 BRPM | TEMPERATURE: 99.4 F | BODY MASS INDEX: 18.96 KG/M2 | DIASTOLIC BLOOD PRESSURE: 102 MMHG | OXYGEN SATURATION: 98 % | HEIGHT: 72 IN

## 2018-04-20 DIAGNOSIS — F10.929 ALCOHOLIC INTOXICATION WITH COMPLICATION (HCC): Primary | ICD-10-CM

## 2018-04-20 LAB
ALBUMIN SERPL-MCNC: 3.9 G/DL (ref 3.5–5)
ALBUMIN/GLOB SERPL: 1.4 {RATIO} (ref 1.2–3.5)
ALP SERPL-CCNC: 48 U/L (ref 50–136)
ALT SERPL-CCNC: 51 U/L (ref 12–65)
ANION GAP SERPL CALC-SCNC: 19 MMOL/L (ref 7–16)
AST SERPL-CCNC: 93 U/L (ref 15–37)
BASOPHILS # BLD: 0 K/UL (ref 0–0.2)
BASOPHILS NFR BLD: 1 % (ref 0–2)
BILIRUB SERPL-MCNC: 0.8 MG/DL (ref 0.2–1.1)
BUN SERPL-MCNC: 12 MG/DL (ref 6–23)
CALCIUM SERPL-MCNC: 8 MG/DL (ref 8.3–10.4)
CHLORIDE SERPL-SCNC: 98 MMOL/L (ref 98–107)
CO2 SERPL-SCNC: 24 MMOL/L (ref 21–32)
CREAT SERPL-MCNC: 0.69 MG/DL (ref 0.8–1.5)
DIFFERENTIAL METHOD BLD: ABNORMAL
EOSINOPHIL # BLD: 0.1 K/UL (ref 0–0.8)
EOSINOPHIL NFR BLD: 4 % (ref 0.5–7.8)
ERYTHROCYTE [DISTWIDTH] IN BLOOD BY AUTOMATED COUNT: 14.2 % (ref 11.9–14.6)
ETHANOL SERPL-MCNC: 283 MG/DL
GLOBULIN SER CALC-MCNC: 2.7 G/DL (ref 2.3–3.5)
GLUCOSE SERPL-MCNC: 82 MG/DL (ref 65–100)
HCT VFR BLD AUTO: 41 % (ref 41.1–50.3)
HGB BLD-MCNC: 14.5 G/DL (ref 13.6–17.2)
IMM GRANULOCYTES # BLD: 0 K/UL (ref 0–0.5)
IMM GRANULOCYTES NFR BLD AUTO: 1 % (ref 0–5)
LYMPHOCYTES # BLD: 1 K/UL (ref 0.5–4.6)
LYMPHOCYTES NFR BLD: 37 % (ref 13–44)
MCH RBC QN AUTO: 32.4 PG (ref 26.1–32.9)
MCHC RBC AUTO-ENTMCNC: 35.4 G/DL (ref 31.4–35)
MCV RBC AUTO: 91.5 FL (ref 79.6–97.8)
MONOCYTES # BLD: 0.5 K/UL (ref 0.1–1.3)
MONOCYTES NFR BLD: 17 % (ref 4–12)
NEUTS SEG # BLD: 1.2 K/UL (ref 1.7–8.2)
NEUTS SEG NFR BLD: 40 % (ref 43–78)
PLATELET # BLD AUTO: 87 K/UL (ref 150–450)
PMV BLD AUTO: 9.5 FL (ref 10.8–14.1)
POTASSIUM SERPL-SCNC: 3.8 MMOL/L (ref 3.5–5.1)
PROT SERPL-MCNC: 6.6 G/DL (ref 6.3–8.2)
RBC # BLD AUTO: 4.48 M/UL (ref 4.23–5.67)
SODIUM SERPL-SCNC: 141 MMOL/L (ref 136–145)
WBC # BLD AUTO: 2.8 K/UL (ref 4.3–11.1)

## 2018-04-20 PROCEDURE — 85025 COMPLETE CBC W/AUTO DIFF WBC: CPT | Performed by: EMERGENCY MEDICINE

## 2018-04-20 PROCEDURE — 80053 COMPREHEN METABOLIC PANEL: CPT | Performed by: EMERGENCY MEDICINE

## 2018-04-20 PROCEDURE — 74011250636 HC RX REV CODE- 250/636: Performed by: EMERGENCY MEDICINE

## 2018-04-20 PROCEDURE — 74011250637 HC RX REV CODE- 250/637: Performed by: EMERGENCY MEDICINE

## 2018-04-20 PROCEDURE — 99285 EMERGENCY DEPT VISIT HI MDM: CPT | Performed by: EMERGENCY MEDICINE

## 2018-04-20 PROCEDURE — 96374 THER/PROPH/DIAG INJ IV PUSH: CPT | Performed by: EMERGENCY MEDICINE

## 2018-04-20 PROCEDURE — 96361 HYDRATE IV INFUSION ADD-ON: CPT | Performed by: EMERGENCY MEDICINE

## 2018-04-20 PROCEDURE — 80307 DRUG TEST PRSMV CHEM ANLYZR: CPT | Performed by: EMERGENCY MEDICINE

## 2018-04-20 RX ORDER — LORAZEPAM 2 MG/ML
1 INJECTION INTRAMUSCULAR
Status: COMPLETED | OUTPATIENT
Start: 2018-04-20 | End: 2018-04-20

## 2018-04-20 RX ORDER — SODIUM CHLORIDE 9 MG/ML
100 INJECTION, SOLUTION INTRAVENOUS CONTINUOUS
Status: DISCONTINUED | OUTPATIENT
Start: 2018-04-20 | End: 2018-04-20 | Stop reason: HOSPADM

## 2018-04-20 RX ORDER — CHLORDIAZEPOXIDE HYDROCHLORIDE 25 MG/1
50 CAPSULE, GELATIN COATED ORAL
Status: COMPLETED | OUTPATIENT
Start: 2018-04-20 | End: 2018-04-20

## 2018-04-20 RX ADMIN — LORAZEPAM 1 MG: 2 INJECTION INTRAMUSCULAR; INTRAVENOUS at 17:08

## 2018-04-20 RX ADMIN — CHLORDIAZEPOXIDE HYDROCHLORIDE 50 MG: 25 CAPSULE ORAL at 17:07

## 2018-04-20 RX ADMIN — SODIUM CHLORIDE 100 ML/HR: 900 INJECTION, SOLUTION INTRAVENOUS at 15:56

## 2018-04-20 NOTE — PROGRESS NOTES
Visited with patient - see my note from 2/7/17. Patient states that he went to [facility] but only stayed 3 months and then walked out because he thought he could do it on his own. States he was afraid he was going to lose his house and needed to get a job. States he just left there 'recently' and got a job but he quit that same job this week. States he realizes that he needs to make a choice when he gets up in the morning to go to work instead of getting drunk but states he is not sure if he can do it. States [facility] won't take him back because he 'walked out' and I have told him there are other similar programs in bordering [facility] if he is willing to commit to a longer stay. Patient again brings up losing his house and we discussed that he may lose his house anyway if he continues to drink. Patient acknowledges this. Notified patient that I have called [facility] and they don't have any beds but that he can call at 8:30 tomorrow morning. Patient states he did his phone interview yesterday and they are expecting him today. 20 Sherly Quinteros at patient's bedside who state patient needed to call this morning at 8:30 to check on bed status after phone interview and that all beds are gone. Advised to call tomorrow morning at 8:30 and that it will be first come, first serve. Repeated this to patient several times. States his friend Elda Chávez is coming to pick him up and is aware as well and will be staying with him tonight. Would just like something for his nerves until he can get into [facility]. Notified [facility]YSON.

## 2018-04-20 NOTE — ED NOTES
EMS provided 1L NS upon arrival. Infusion has completed. Spoke with Dr. Poonam Browne and he states 1L only at this time.

## 2018-04-20 NOTE — ED PROVIDER NOTES
HPI Comments: Patient is a 59-year-old male presenting with concerns in regards to his alcoholism. Patient reports that he has been drinking heavily for years. States his last drink was approximately 3 hours ago. States he drank 2 bottles of wine. He reports he's been in contact with the [facility] but has been \"disowned by his family\" and could not find a ride to the [facility]. He called EMS who refused to take him to the [facility] but instead brought him to the emergency department. Patient reports he vomited several times earlier today. Patient also states he feels [facility]" and is concerned that he might be withdrawing despite smelling strongly of alcohol    Patient is a 40 y.o. male presenting with alcohol problem. The history is provided by the patient. No  was used. Alcohol Problem   There areno confusion present at this time. Associated symptoms include nausea and vomiting. Pertinent negatives include no fever. History reviewed. No pertinent past medical history. History reviewed. No pertinent surgical history. History reviewed. No pertinent family history. Social History     Social History    Marital status:      Spouse name: N/A    Number of children: N/A    Years of education: N/A     Occupational History    Not on file. Social History Main Topics    Smoking status: Former Smoker     Quit date: 1/2/2006    Smokeless tobacco: Never Used    Alcohol use 20.5 oz/week     14 Glasses of wine, 6 Cans of beer, 21 Standard drinks or equivalent per week      Comment: daily 2-3     Drug use: No    Sexual activity: Not on file     Other Topics Concern    Not on file     Social History Narrative         ALLERGIES: Review of patient's allergies indicates no known allergies. Review of Systems   Constitutional: Negative for fever. HENT: Negative for congestion. Eyes: Negative for visual disturbance.    Respiratory: Negative for cough and shortness of breath. Gastrointestinal: Positive for nausea and vomiting. Negative for abdominal pain. Genitourinary: Negative for flank pain. Musculoskeletal: Negative for back pain. Skin: Negative for rash. Neurological: Negative for headaches. Psychiatric/Behavioral: Negative for confusion. Vitals:    04/20/18 1459   BP: (!) 133/99   Pulse: 89   Resp: 18   Temp: 98.4 °F (36.9 °C)   SpO2: 99%   Weight: 63.5 kg (140 lb)   Height: 6' (1.829 m)            Physical Exam   Constitutional: He is oriented to person, place, and time. He appears well-developed and well-nourished. No distress. HENT:   Head: Normocephalic and atraumatic. Eyes: Conjunctivae and EOM are normal. Pupils are equal, round, and reactive to light. Neck: Normal range of motion. Neck supple. Cardiovascular: Normal rate, regular rhythm and normal heart sounds. Pulmonary/Chest: Effort normal and breath sounds normal. No respiratory distress. He has no wheezes. He has no rales. Abdominal: Soft. He exhibits no distension. There is no tenderness. There is no rebound. Musculoskeletal: Normal range of motion. He exhibits no edema or tenderness. Neurological: He is alert and oriented to person, place, and time. Mild tremulousness   Skin: Skin is warm and dry. No rash noted. He is not diaphoretic. Psychiatric: He has a normal mood and affect. His behavior is normal.   Vitals reviewed. MDM  Number of Diagnoses or Management Options  Alcoholic intoxication with complication [facility]): new and does not require workup  Diagnosis management comments: Patient appears to be intoxicated. He has some slight tremulousness of. Would like to go to [facility] but there are no beds currently.  discussed this with the patient. Patient has a friend who is working on getting a bed for the patient. Friend is at bedside. Feel we can discharge patient to home. No vomiting in the ED.   We'll give 48 of Librium and 1 of Ativan prior to discharge. Discussed slow taper of alcohol. Patient expressed understanding. Return precautions discussed. Advised to call [phone] [facility]      Good Hernandez MD; 4/20/2018 @5:46 PM Voice dictation software was used during the making of this note. This software is not perfect and grammatical and other typographical errors may be present.   This note has not been proofread for errors.  ===================================================================         Amount and/or Complexity of Data Reviewed  Clinical lab tests: ordered and reviewed (Results for orders placed or performed during the hospital encounter of 04/20/18  -CBC WITH AUTOMATED DIFF       Result                                            Value                         Ref Range                       WBC                                               2.8 (L)                       4.3 - 11.1 K/uL                 RBC                                               4.48                          4.23 - 5.67 M/uL                HGB                                               14.5                          13.6 - 17.2 g/dL                HCT                                               41.0 (L)                      41.1 - 50.3 %                   MCV                                               91.5                          79.6 - 97.8 FL                  MCH                                               32.4                          26.1 - 32.9 PG                  MCHC                                              35.4 (H)                      31.4 - 35.0 g/dL                RDW                                               14.2                          11.9 - 14.6 %                   PLATELET                                          87 (L)                        150 - 450 K/uL                  MPV                                               9.5 (L)                       10.8 - 14.1 FL                  DF AUTOMATED                                                     NEUTROPHILS                                       40 (L)                        43 - 78 %                       LYMPHOCYTES                                       37                            13 - 44 %                       MONOCYTES                                         17 (H)                        4.0 - 12.0 %                    EOSINOPHILS                                       4                             0.5 - 7.8 %                     BASOPHILS                                         1                             0.0 - 2.0 %                     IMMATURE GRANULOCYTES                             1                             0.0 - 5.0 %                     ABS. NEUTROPHILS                                  1.2 (L)                       1.7 - 8.2 K/UL                  ABS. LYMPHOCYTES                                  1.0                           0.5 - 4.6 K/UL                  ABS. MONOCYTES                                    0.5                           0.1 - 1.3 K/UL                  ABS. EOSINOPHILS                                  0.1                           0.0 - 0.8 K/UL                  ABS. BASOPHILS                                    0.0                           0.0 - 0.2 K/UL                  ABS. IMM.  GRANS.                                  0.0                           0.0 - 0.5 K/UL             -METABOLIC PANEL, COMPREHENSIVE       Result                                            Value                         Ref Range                       Sodium                                            141                           136 - 145 mmol/L                Potassium                                         3.8                           3.5 - 5.1 mmol/L                Chloride                                          98                            98 - 107 mmol/L                 CO2 24                            21 - 32 mmol/L                  Anion gap                                         19 (H)                        7 - 16 mmol/L                   Glucose                                           82                            65 - 100 mg/dL                  BUN                                               12                            6 - 23 MG/DL                    Creatinine                                        0.69 (L)                      0.8 - 1.5 MG/DL                 GFR est AA                                        >60                           >60 ml/min/1.73m2               GFR est non-AA                                    >60                           >60 ml/min/1.73m2               Calcium                                           8.0 (L)                       8.3 - 10.4 MG/DL                Bilirubin, total                                  0.8                           0.2 - 1.1 MG/DL                 ALT (SGPT)                                        51                            12 - 65 U/L                     AST (SGOT)                                        93 (H)                        15 - 37 U/L                     Alk. phosphatase                                  48 (L)                        50 - 136 U/L                    Protein, total                                    6.6                           6.3 - 8.2 g/dL                  Albumin                                           3.9                           3.5 - 5.0 g/dL                  Globulin                                          2.7                           2.3 - 3.5 g/dL                  A-G Ratio                                         1.4                           1.2 - 3.5                  -ETHYL ALCOHOL       Result                                            Value                         Ref Range                       ALCOHOL(ETHYL),SERUM                              283 MG/DL                      )  Review and summarize past medical records: yes  Independent visualization of images, tracings, or specimens: yes    Risk of Complications, Morbidity, and/or Mortality  Presenting problems: moderate  Diagnostic procedures: moderate  Management options: moderate    Patient Progress  Patient progress: improved        ED Course       Procedures

## 2018-04-20 NOTE — ED NOTES
I have reviewed discharge instructions with the patient and caregiver. The patient and caregiver verbalized understanding. Patient left ED via Discharge Method: ambulatory to Home with Dougie Abdullahi, friend. Opportunity for questions and clarification provided. Patient given 0 scripts. Instructed pt to call [facility] in the morning. To continue your aftercare when you leave the hospital, you may receive an automated call from our care team to check in on how you are doing. This is a free service and part of our promise to provide the best care and service to meet your aftercare needs.  If you have questions, or wish to unsubscribe from this service please call [phone]. Thank you for Choosing our [facility] Emergency Department.

## 2018-04-20 NOTE — ED TRIAGE NOTES
Pt to ED via EMS from home c/o alcohol intoxication. Pt states he called EMS because he wants help and wants to go to the [facility]. Pt states he drinks 4 bottles of wine a day and has had two so far. States last alcohol was at noon. States he feels as though he is going to have a seizure. States \"i need something to calm down. \"

## 2019-01-14 ENCOUNTER — HOSPITAL ENCOUNTER (EMERGENCY)
Age: 39
Discharge: HOME | End: 2019-01-15
Attending: EMERGENCY MEDICINE
Payer: SELF-PAY

## 2019-01-14 DIAGNOSIS — F10.939 ALCOHOL WITHDRAWAL SYNDROME WITH COMPLICATION (HCC): Primary | ICD-10-CM

## 2019-01-14 DIAGNOSIS — R11.2 NAUSEA AND VOMITING, INTRACTABILITY OF VOMITING NOT SPECIFIED, UNSPECIFIED VOMITING TYPE: ICD-10-CM

## 2019-01-14 LAB
ALBUMIN SERPL-MCNC: 4.3 G/DL (ref 3.5–5)
ALBUMIN/GLOB SERPL: 1.3 {RATIO}
ALP SERPL-CCNC: 55 U/L (ref 50–136)
ALT SERPL-CCNC: 36 U/L (ref 12–65)
ANION GAP SERPL CALC-SCNC: 12 MMOL/L
AST SERPL-CCNC: 55 U/L (ref 15–37)
BASOPHILS # BLD: 0.1 K/UL (ref 0–0.2)
BASOPHILS NFR BLD: 1 % (ref 0–2)
BILIRUB SERPL-MCNC: 1.5 MG/DL (ref 0.2–1.1)
BUN SERPL-MCNC: 16 MG/DL (ref 6–23)
CALCIUM SERPL-MCNC: 9.5 MG/DL (ref 8.3–10.4)
CHLORIDE SERPL-SCNC: 97 MMOL/L (ref 98–107)
CO2 SERPL-SCNC: 28 MMOL/L (ref 21–32)
CREAT SERPL-MCNC: 0.84 MG/DL (ref 0.8–1.5)
DIFFERENTIAL METHOD BLD: ABNORMAL
EOSINOPHIL # BLD: 0.4 K/UL (ref 0–0.8)
EOSINOPHIL NFR BLD: 4 % (ref 0.5–7.8)
ERYTHROCYTE [DISTWIDTH] IN BLOOD BY AUTOMATED COUNT: 12.4 % (ref 11.9–14.6)
ETHANOL SERPL-MCNC: <3 MG/DL
GLOBULIN SER CALC-MCNC: 3.3 G/DL (ref 2.3–3.5)
GLUCOSE SERPL-MCNC: 162 MG/DL (ref 65–100)
HCT VFR BLD AUTO: 45.2 % (ref 41.1–50.3)
HGB BLD-MCNC: 15.5 G/DL (ref 13.6–17.2)
IMM GRANULOCYTES # BLD AUTO: 0 K/UL (ref 0–0.5)
IMM GRANULOCYTES NFR BLD AUTO: 0 % (ref 0–5)
LIPASE SERPL-CCNC: 197 U/L (ref 73–393)
LYMPHOCYTES # BLD: 2.4 K/UL (ref 0.5–4.6)
LYMPHOCYTES NFR BLD: 26 % (ref 13–44)
MAGNESIUM SERPL-MCNC: 1.9 MG/DL (ref 1.8–2.4)
MCH RBC QN AUTO: 30.9 PG (ref 26.1–32.9)
MCHC RBC AUTO-ENTMCNC: 34.3 G/DL (ref 31.4–35)
MCV RBC AUTO: 90.2 FL (ref 79.6–97.8)
MONOCYTES # BLD: 1 K/UL (ref 0.1–1.3)
MONOCYTES NFR BLD: 11 % (ref 4–12)
NEUTS SEG # BLD: 5.2 K/UL (ref 1.7–8.2)
NEUTS SEG NFR BLD: 58 % (ref 43–78)
NRBC # BLD: 0 K/UL (ref 0–0.2)
PLATELET # BLD AUTO: 247 K/UL (ref 150–450)
PMV BLD AUTO: 9 FL (ref 9.4–12.3)
POTASSIUM SERPL-SCNC: 3.3 MMOL/L (ref 3.5–5.1)
PROT SERPL-MCNC: 7.6 G/DL
RBC # BLD AUTO: 5.01 M/UL (ref 4.23–5.6)
SODIUM SERPL-SCNC: 137 MMOL/L (ref 136–145)
WBC # BLD AUTO: 9 K/UL (ref 4.3–11.1)

## 2019-01-14 PROCEDURE — 85025 COMPLETE CBC W/AUTO DIFF WBC: CPT

## 2019-01-14 PROCEDURE — 99283 EMERGENCY DEPT VISIT LOW MDM: CPT | Performed by: EMERGENCY MEDICINE

## 2019-01-14 PROCEDURE — 83690 ASSAY OF LIPASE: CPT

## 2019-01-14 PROCEDURE — 74011000250 HC RX REV CODE- 250: Performed by: EMERGENCY MEDICINE

## 2019-01-14 PROCEDURE — 80053 COMPREHEN METABOLIC PANEL: CPT

## 2019-01-14 PROCEDURE — 83735 ASSAY OF MAGNESIUM: CPT

## 2019-01-14 PROCEDURE — 74011250636 HC RX REV CODE- 250/636: Performed by: EMERGENCY MEDICINE

## 2019-01-14 PROCEDURE — 96375 TX/PRO/DX INJ NEW DRUG ADDON: CPT | Performed by: EMERGENCY MEDICINE

## 2019-01-14 PROCEDURE — 96365 THER/PROPH/DIAG IV INF INIT: CPT | Performed by: EMERGENCY MEDICINE

## 2019-01-14 PROCEDURE — 80307 DRUG TEST PRSMV CHEM ANLYZR: CPT

## 2019-01-14 RX ORDER — ONDANSETRON 2 MG/ML
4 INJECTION INTRAMUSCULAR; INTRAVENOUS
Status: COMPLETED | OUTPATIENT
Start: 2019-01-14 | End: 2019-01-14

## 2019-01-14 RX ORDER — DEXTROSE, SODIUM CHLORIDE, SODIUM LACTATE, POTASSIUM CHLORIDE, AND CALCIUM CHLORIDE 5; .6; .31; .03; .02 G/100ML; G/100ML; G/100ML; G/100ML; G/100ML
500 INJECTION, SOLUTION INTRAVENOUS ONCE
Status: COMPLETED | OUTPATIENT
Start: 2019-01-14 | End: 2019-01-15

## 2019-01-14 RX ORDER — LORAZEPAM 2 MG/ML
1 INJECTION INTRAMUSCULAR
Status: COMPLETED | OUTPATIENT
Start: 2019-01-14 | End: 2019-01-14

## 2019-01-14 RX ADMIN — ONDANSETRON 4 MG: 2 INJECTION INTRAMUSCULAR; INTRAVENOUS at 22:29

## 2019-01-14 RX ADMIN — FOLIC ACID: 5 INJECTION, SOLUTION INTRAMUSCULAR; INTRAVENOUS; SUBCUTANEOUS at 23:00

## 2019-01-14 RX ADMIN — SODIUM CHLORIDE, SODIUM LACTATE, POTASSIUM CHLORIDE, CALCIUM CHLORIDE AND DEXTROSE MONOHYDRATE 500 ML/HR: 5; 600; 310; 30; 20 INJECTION, SOLUTION INTRAVENOUS at 22:27

## 2019-01-14 RX ADMIN — LORAZEPAM 1 MG: 2 INJECTION INTRAMUSCULAR; INTRAVENOUS at 22:19

## 2019-01-15 VITALS
DIASTOLIC BLOOD PRESSURE: 87 MMHG | HEIGHT: 72 IN | HEART RATE: 92 BPM | WEIGHT: 145 LBS | OXYGEN SATURATION: 99 % | RESPIRATION RATE: 16 BRPM | TEMPERATURE: 98.4 F | BODY MASS INDEX: 19.64 KG/M2 | SYSTOLIC BLOOD PRESSURE: 138 MMHG

## 2019-01-15 RX ORDER — CHLORDIAZEPOXIDE HYDROCHLORIDE 25 MG/1
CAPSULE, GELATIN COATED ORAL
Qty: 12 CAP | Refills: 0 | Status: ON HOLD | OUTPATIENT
Start: 2019-01-15 | End: 2021-01-11

## 2019-01-15 NOTE — ED PROVIDER NOTES
55-year-old male states she's been drinking about 15-17 drinks per day over the last week. Started vomiting this afternoon was thrown up 4 times. Not able to keep anything down. Not eaten very much in the last week as well. States history of withdrawal symptoms and also withdrawal seizures in the past.  Feels jittery but no syncope. No fever no bleeding no diarrhea septic or chest or abdominal pain. The history is provided by the patient. Alcohol Problem Primary symptoms include: seizures (in the past) and intoxication. There areno loss of consciousness and no hallucinations present at this time. This is a new problem. The current episode started more than 1 week ago. The problem has not changed since onset. Suspected agents include alcohol. Associated symptoms include nausea and vomiting. Pertinent negatives include no fever. Associated medical issues include addiction treatment and withdrawal syndrome. History reviewed. No pertinent past medical history. History reviewed. No pertinent surgical history. History reviewed. No pertinent family history. Social History Socioeconomic History  Marital status:  Spouse name: Not on file  Number of children: Not on file  Years of education: Not on file  Highest education level: Not on file Social Needs  Financial resource strain: Not on file  Food insecurity - worry: Not on file  Food insecurity - inability: Not on file  Transportation needs - medical: Not on file  Transportation needs - non-medical: Not on file Occupational History  Not on file Tobacco Use  Smoking status: Former Smoker Last attempt to quit: 2006 Years since quittin.0  Smokeless tobacco: Never Used Substance and Sexual Activity  Alcohol use: Yes Alcohol/week: 20.5 oz Types: 14 Glasses of wine, 6 Cans of beer, 21 Standard drinks or equivalent per week Comment: daily 2-3  Drug use: No  
 Sexual activity: Not on file Other Topics Concern  Not on file Social History Narrative  Not on file ALLERGIES: Patient has no known allergies. Review of Systems Constitutional: Negative for chills and fever. Respiratory: Negative for cough and shortness of breath. Gastrointestinal: Positive for nausea and vomiting. Negative for abdominal pain. Skin: Negative for color change and rash. Neurological: Positive for seizures (in the past) and headaches. Negative for loss of consciousness. Psychiatric/Behavioral: Negative for hallucinations. Vitals:  
 01/14/19 2116 BP: (!) 157/98 Pulse: (!) 110 Resp: 16 Temp: 98.2 °F (36.8 °C) SpO2: 98% Weight: 65.8 kg (145 lb) Height: 6' (1.829 m) Physical Exam  
Constitutional: He is oriented to person, place, and time. He appears well-developed and well-nourished. No distress. HENT:  
Head: Normocephalic and atraumatic. Right Ear: External ear normal.  
Left Ear: External ear normal.  
Mouth/Throat: Oropharynx is clear and moist. No oropharyngeal exudate. Eyes: Conjunctivae and EOM are normal. Pupils are equal, round, and reactive to light. Neck: Normal range of motion. Neck supple. Cardiovascular: Normal rate, regular rhythm and intact distal pulses. No murmur heard. Pulmonary/Chest: Breath sounds normal. No respiratory distress. Abdominal: Soft. Bowel sounds are normal. He exhibits no mass. There is no tenderness. There is no rebound and no guarding. No hernia. Neurological: He is alert and oriented to person, place, and time. Gait normal.  
Nl speech Mildly tremulous Skin: Skin is warm and dry. Psychiatric: He has a normal mood and affect. His speech is normal.  
Nursing note and vitals reviewed. MDM Number of Diagnoses or Management Options Diagnosis management comments: electrolyte problems, dehydration, hyperglycemia. Hydration along with her placing thiamine and glucose. Ativan prophylaxis. Check lipase Amount and/or Complexity of Data Reviewed Clinical lab tests: ordered and reviewed Review and summarize past medical records: yes (Review of old records reveals a least one or 2 seizures in the past.  At some point he was on Keppra.) Independent visualization of images, tracings, or specimens: yes Risk of Complications, Morbidity, and/or Mortality Presenting problems: moderate Diagnostic procedures: low Management options: moderate Procedures Patient denies any seizures in the past that were not due to alcohol withdrawal.

## 2019-01-15 NOTE — DISCHARGE INSTRUCTIONS
Patient Education        Learning About Alcohol Withdrawal  What is alcohol withdrawal?    If you drink alcohol regularly (more than a few drinks on most days) and then suddenly stop or cut down, you may go through some physical and emotional problems while the alcohol clears out of your system. This is called withdrawal. Clearing the alcohol from your body is called detoxification, or detox. What are the symptoms? Symptoms of alcohol withdrawal may start as soon as 4 to 12 hours after you stop drinking. Or they may not start until several days after the last drink. Mild symptoms include:  · Nausea. · Sweating. · Shakiness. · Diarrhea. · Intense worry. · Disturbed sleep. · Headache. More severe symptoms include:  · Vomiting or belly pain. · Being confused, upset, and irritable. · Changed sensations. You might feel things on your body that aren't really there. Or you may see or hear things that aren't there. · Trembling. · Being short of breath or having pain in your chest.  · Having seizures. Symptoms may peak within a few days. Mild symptoms can last for a few weeks. If your symptoms are severe, you'll need to see a doctor. What is the treatment for alcohol withdrawal?  Most people may be able to cut down or stop drinking with only mild withdrawal. They can stay safe by simply resting, drinking lots of fluids, and eating healthy foods. But people who drink large amounts of alcohol or are at risk for severe withdrawal symptoms should not try to detox at home unless they work closely with a doctor to manage it. A person can die of severe alcohol withdrawal.  Before you stop drinking, talk to your doctor about how you plan to stop. Be completely honest about how much you've been drinking. Your doctor will figure out if you need to detox in a medical center. You may get medicine to treat the symptoms whether you are at home or in a medical center. Medicine that treats seizures can also help.  Your doctor will explain what types of medicine might help you. You may start with a high dose and then take smaller amounts over several days. There's also medicine that can help you avoid alcohol while you recover. How can you manage your withdrawal and recovery? Here are a few tips that can help you to not start drinking again. · Make sure there's no alcohol in the house. This includes drinks as well as liquid medicines, rubbing alcohol, and certain flavorings like vanilla extract. · Try not to hang out with people you used to drink with. · Don't go it alone. Spend time with people who support the changes you are making in your life. This includes asking for advice and help from people who have stopped drinking. You might also try mutual support groups such as Alcoholics Anonymous. · Drink lots of fluids. · Eat snacks such as fruit, cheese and crackers, and pretzels. High-carbohydrate foods may help reduce the craving for alcohol. What happens after withdrawal?  It can be hard to stop drinking. But after you clear the alcohol from your system, you can start the next, healthier part of your life. After detox, you will focus on staying alcohol-free. You can learn skills that you can use to stay abstinent (or sober) as you recover. Finding new ways to deal with life's challenges, without drinking, takes time and effort. Recovery is a long-term process. It's not something you can achieve in a few weeks. Most people get some type of therapy, such as group counseling. You also may need medicine to help you stay sober. Treatment doesn't focus on alcohol use alone. It may address other parts of your life, like your relationships, work, medical problems, and home life. Treatment, support, patience, and commitment will help you make the changes you need to live a goss life without alcohol.  You may find, over time, that the process gets easier, life becomes more joyous, and your connections to others becomes more rewarding. Where can you find help? Behavioral Health Treatment Services . This service from the [facility] Abuse and [facility]  can help you find local alcohol treatment services. Search online at [employer]. [facility].gov or call 0-635-112-VBRD ([phone]), or [employer] 5-650.212.8376. Where can you learn more? Go to http://estephania-daron.info/. Enter A011 in the search box to learn more about \"Learning About Alcohol Withdrawal.\"  Current as of: October 9, 2017  Content Version: 11.8  © 5783-4375 Healthwise, [employer]. Care instructions adapted under license by [employer] (which disclaims liability or warranty for this information). If you have questions about a medical condition or this instruction, always ask your healthcare professional. [address] any warranty or liability for your use of this information.

## 2019-01-15 NOTE — ED TRIAGE NOTES
Pt states he is at a rehab place for alcoholism but states he has been drinking for a few days and is now having tremors and nausea and vomiting. States he has not been able to eat in a few days. States he has had seizures in the past for this issue.

## 2019-01-15 NOTE — ED NOTES
I have reviewed discharge instructions with the patient. The patient verbalized understanding. Patient left ED via Discharge Method: ambulatory to stay in room sleeping until 0600 with self. Opportunity for questions and clarification provided. Patient given 1 scripts. To continue your aftercare when you leave the hospital, you may receive an automated call from our care team to check in on how you are doing. This is a free service and part of our promise to provide the best care and service to meet your aftercare needs.  If you have questions, or wish to unsubscribe from this service please call [phone]. Thank you for Choosing our [facility] Emergency Department.

## 2019-06-19 ENCOUNTER — HOSPITAL ENCOUNTER (OUTPATIENT)
Age: 39
Setting detail: OBSERVATION
Discharge: HOME | End: 2019-06-20
Attending: EMERGENCY MEDICINE | Admitting: INTERNAL MEDICINE
Payer: SUBSIDIZED

## 2019-06-19 DIAGNOSIS — F10.939 ALCOHOL WITHDRAWAL SYNDROME WITH COMPLICATION (HCC): Primary | ICD-10-CM

## 2019-06-19 DIAGNOSIS — R11.2 NON-INTRACTABLE VOMITING WITH NAUSEA, UNSPECIFIED VOMITING TYPE: ICD-10-CM

## 2019-06-19 DIAGNOSIS — K29.20 ACUTE ALCOHOLIC GASTRITIS, PRESENCE OF BLEEDING UNSPECIFIED: ICD-10-CM

## 2019-06-19 LAB
ALBUMIN SERPL-MCNC: 4.3 G/DL (ref 3.5–5)
ALBUMIN/GLOB SERPL: 1.2 {RATIO} (ref 1.2–3.5)
ALP SERPL-CCNC: 58 U/L (ref 50–136)
ALT SERPL-CCNC: 32 U/L (ref 12–65)
ANION GAP SERPL CALC-SCNC: 16 MMOL/L (ref 7–16)
AST SERPL-CCNC: 32 U/L (ref 15–37)
BASOPHILS # BLD: 0.1 K/UL (ref 0–0.2)
BASOPHILS NFR BLD: 1 % (ref 0–2)
BILIRUB SERPL-MCNC: 1 MG/DL (ref 0.2–1.1)
BUN SERPL-MCNC: 8 MG/DL (ref 6–23)
CALCIUM SERPL-MCNC: 8.9 MG/DL (ref 8.3–10.4)
CHLORIDE SERPL-SCNC: 100 MMOL/L (ref 98–107)
CO2 SERPL-SCNC: 22 MMOL/L (ref 21–32)
CREAT SERPL-MCNC: 0.85 MG/DL (ref 0.8–1.5)
DIFFERENTIAL METHOD BLD: ABNORMAL
EOSINOPHIL # BLD: 0.5 K/UL (ref 0–0.8)
EOSINOPHIL NFR BLD: 10 % (ref 0.5–7.8)
ERYTHROCYTE [DISTWIDTH] IN BLOOD BY AUTOMATED COUNT: 13.3 % (ref 11.9–14.6)
ETHANOL SERPL-MCNC: 121 MG/DL
GLOBULIN SER CALC-MCNC: 3.5 G/DL (ref 2.3–3.5)
GLUCOSE SERPL-MCNC: 120 MG/DL (ref 65–100)
HCT VFR BLD AUTO: 46.6 % (ref 41.1–50.3)
HGB BLD-MCNC: 16.8 G/DL (ref 13.6–17.2)
IMM GRANULOCYTES # BLD AUTO: 0 K/UL (ref 0–0.5)
IMM GRANULOCYTES NFR BLD AUTO: 0 % (ref 0–5)
LIPASE SERPL-CCNC: 143 U/L (ref 73–393)
LYMPHOCYTES # BLD: 2.2 K/UL (ref 0.5–4.6)
LYMPHOCYTES NFR BLD: 46 % (ref 13–44)
MCH RBC QN AUTO: 32.9 PG (ref 26.1–32.9)
MCHC RBC AUTO-ENTMCNC: 36.1 G/DL (ref 31.4–35)
MCV RBC AUTO: 91.4 FL (ref 79.6–97.8)
MONOCYTES # BLD: 0.7 K/UL (ref 0.1–1.3)
MONOCYTES NFR BLD: 15 % (ref 4–12)
NEUTS SEG # BLD: 1.4 K/UL (ref 1.7–8.2)
NEUTS SEG NFR BLD: 28 % (ref 43–78)
NRBC # BLD: 0 K/UL (ref 0–0.2)
PLATELET # BLD AUTO: 210 K/UL (ref 150–450)
PMV BLD AUTO: 8.7 FL (ref 9.4–12.3)
POTASSIUM SERPL-SCNC: 3.4 MMOL/L (ref 3.5–5.1)
PROT SERPL-MCNC: 7.8 G/DL (ref 6.3–8.2)
RBC # BLD AUTO: 5.1 M/UL (ref 4.23–5.6)
SODIUM SERPL-SCNC: 138 MMOL/L (ref 136–145)
WBC # BLD AUTO: 4.8 K/UL (ref 4.3–11.1)

## 2019-06-19 PROCEDURE — 96365 THER/PROPH/DIAG IV INF INIT: CPT | Performed by: EMERGENCY MEDICINE

## 2019-06-19 PROCEDURE — 74011250636 HC RX REV CODE- 250/636: Performed by: EMERGENCY MEDICINE

## 2019-06-19 PROCEDURE — 99218 HC RM OBSERVATION: CPT

## 2019-06-19 PROCEDURE — 74011250636 HC RX REV CODE- 250/636: Performed by: INTERNAL MEDICINE

## 2019-06-19 PROCEDURE — 85025 COMPLETE CBC W/AUTO DIFF WBC: CPT

## 2019-06-19 PROCEDURE — 80307 DRUG TEST PRSMV CHEM ANLYZR: CPT

## 2019-06-19 PROCEDURE — 96366 THER/PROPH/DIAG IV INF ADDON: CPT | Performed by: EMERGENCY MEDICINE

## 2019-06-19 PROCEDURE — 96376 TX/PRO/DX INJ SAME DRUG ADON: CPT | Performed by: EMERGENCY MEDICINE

## 2019-06-19 PROCEDURE — 99284 EMERGENCY DEPT VISIT MOD MDM: CPT | Performed by: EMERGENCY MEDICINE

## 2019-06-19 PROCEDURE — 74011000250 HC RX REV CODE- 250: Performed by: INTERNAL MEDICINE

## 2019-06-19 PROCEDURE — 80053 COMPREHEN METABOLIC PANEL: CPT

## 2019-06-19 PROCEDURE — 96361 HYDRATE IV INFUSION ADD-ON: CPT | Performed by: EMERGENCY MEDICINE

## 2019-06-19 PROCEDURE — 74011250636 HC RX REV CODE- 250/636: Performed by: PHYSICIAN ASSISTANT

## 2019-06-19 PROCEDURE — 96375 TX/PRO/DX INJ NEW DRUG ADDON: CPT | Performed by: EMERGENCY MEDICINE

## 2019-06-19 PROCEDURE — 74011250637 HC RX REV CODE- 250/637: Performed by: EMERGENCY MEDICINE

## 2019-06-19 PROCEDURE — 83690 ASSAY OF LIPASE: CPT

## 2019-06-19 PROCEDURE — 74011250637 HC RX REV CODE- 250/637: Performed by: INTERNAL MEDICINE

## 2019-06-19 PROCEDURE — 96374 THER/PROPH/DIAG INJ IV PUSH: CPT | Performed by: EMERGENCY MEDICINE

## 2019-06-19 RX ORDER — LORAZEPAM 1 MG/1
3-4 TABLET ORAL
Status: ACTIVE | OUTPATIENT
Start: 2019-06-19 | End: 2019-06-20

## 2019-06-19 RX ORDER — ENOXAPARIN SODIUM 100 MG/ML
40 INJECTION SUBCUTANEOUS EVERY 24 HOURS
Status: DISCONTINUED | OUTPATIENT
Start: 2019-06-20 | End: 2019-06-20 | Stop reason: HOSPADM

## 2019-06-19 RX ORDER — SODIUM CHLORIDE 0.9 % (FLUSH) 0.9 %
5-40 SYRINGE (ML) INJECTION EVERY 8 HOURS
Status: DISCONTINUED | OUTPATIENT
Start: 2019-06-19 | End: 2019-06-20 | Stop reason: HOSPADM

## 2019-06-19 RX ORDER — SODIUM CHLORIDE 0.9 % (FLUSH) 0.9 %
5-40 SYRINGE (ML) INJECTION AS NEEDED
Status: DISCONTINUED | OUTPATIENT
Start: 2019-06-19 | End: 2019-06-20 | Stop reason: HOSPADM

## 2019-06-19 RX ORDER — LORAZEPAM 1 MG/1
1-2 TABLET ORAL
Status: ACTIVE | OUTPATIENT
Start: 2019-06-19 | End: 2019-06-20

## 2019-06-19 RX ORDER — FOLIC ACID 1 MG/1
1 TABLET ORAL ONCE
Status: COMPLETED | OUTPATIENT
Start: 2019-06-19 | End: 2019-06-19

## 2019-06-19 RX ORDER — FOLIC ACID 1 MG/1
1 TABLET ORAL DAILY
Status: DISCONTINUED | OUTPATIENT
Start: 2019-06-20 | End: 2019-06-20 | Stop reason: HOSPADM

## 2019-06-19 RX ORDER — ONDANSETRON 2 MG/ML
4 INJECTION INTRAMUSCULAR; INTRAVENOUS
Status: COMPLETED | OUTPATIENT
Start: 2019-06-19 | End: 2019-06-19

## 2019-06-19 RX ORDER — CHLORDIAZEPOXIDE HYDROCHLORIDE 25 MG/1
50 CAPSULE, GELATIN COATED ORAL 3 TIMES DAILY
Status: DISCONTINUED | OUTPATIENT
Start: 2019-06-19 | End: 2019-06-20 | Stop reason: HOSPADM

## 2019-06-19 RX ORDER — FAMOTIDINE 20 MG/1
40 TABLET, FILM COATED ORAL
Status: COMPLETED | OUTPATIENT
Start: 2019-06-19 | End: 2019-06-19

## 2019-06-19 RX ORDER — LANOLIN ALCOHOL/MO/W.PET/CERES
100 CREAM (GRAM) TOPICAL ONCE
Status: COMPLETED | OUTPATIENT
Start: 2019-06-19 | End: 2019-06-19

## 2019-06-19 RX ORDER — LANOLIN ALCOHOL/MO/W.PET/CERES
100 CREAM (GRAM) TOPICAL DAILY
Status: DISCONTINUED | OUTPATIENT
Start: 2019-06-20 | End: 2019-06-20 | Stop reason: HOSPADM

## 2019-06-19 RX ORDER — SODIUM CHLORIDE 0.9 % (FLUSH) 0.9 %
5-40 SYRINGE (ML) INJECTION EVERY 8 HOURS
Status: DISCONTINUED | OUTPATIENT
Start: 2019-06-19 | End: 2019-06-19 | Stop reason: SDUPTHER

## 2019-06-19 RX ORDER — SODIUM CHLORIDE 0.9 % (FLUSH) 0.9 %
5-40 SYRINGE (ML) INJECTION AS NEEDED
Status: DISCONTINUED | OUTPATIENT
Start: 2019-06-19 | End: 2019-06-19 | Stop reason: SDUPTHER

## 2019-06-19 RX ORDER — LORAZEPAM 2 MG/ML
1 INJECTION INTRAMUSCULAR
Status: DISCONTINUED | OUTPATIENT
Start: 2019-06-19 | End: 2019-06-20 | Stop reason: HOSPADM

## 2019-06-19 RX ADMIN — SODIUM CHLORIDE 1000 ML: 900 INJECTION, SOLUTION INTRAVENOUS at 20:08

## 2019-06-19 RX ADMIN — ONDANSETRON 4 MG: 2 INJECTION INTRAMUSCULAR; INTRAVENOUS at 18:30

## 2019-06-19 RX ADMIN — SODIUM CHLORIDE 1000 ML: 900 INJECTION, SOLUTION INTRAVENOUS at 18:30

## 2019-06-19 RX ADMIN — FAMOTIDINE 40 MG: 20 TABLET, FILM COATED ORAL at 20:06

## 2019-06-19 RX ADMIN — CHLORDIAZEPOXIDE HYDROCHLORIDE 50 MG: 25 CAPSULE ORAL at 22:00

## 2019-06-19 RX ADMIN — Medication 100 MG: at 20:07

## 2019-06-19 RX ADMIN — THIAMINE HYDROCHLORIDE: 100 INJECTION, SOLUTION INTRAMUSCULAR; INTRAVENOUS at 20:57

## 2019-06-19 RX ADMIN — FOLIC ACID 1 MG: 1 TABLET ORAL at 20:07

## 2019-06-19 RX ADMIN — ONDANSETRON 4 MG: 2 INJECTION INTRAMUSCULAR; INTRAVENOUS at 20:04

## 2019-06-19 RX ADMIN — B-COMPLEX W/ C & FOLIC ACID TAB 1 TABLET: TAB at 19:31

## 2019-06-19 NOTE — ED PROVIDER NOTES
The patient is a 80-year-old male presented to form a day from work where he says that he felt nauseous and was having stomach pain. Patient has an extensive history of alcoholism documented in the medical records. He was seen at [facility] emergency room 2 days ago. The patient said that he drove himself here from work and initially was not being honest with me saying he had only had a little bit to drink this morning. The patient's blood alcohol level in the ER as 112. The patient states he has had alcohol withdrawal seizures before once at work and begin when he went to rehabilitation. He said he was sober for 6 months but started drinking again a month ago. The patient denies any syncopal events or seizure activity recently. He is concerned he may be having some blood in his emesis but has not had any black stools. Past Medical History:   Diagnosis Date    Ill-defined condition     ETOH    Psychiatric disorder     anxiety/depression       History reviewed. No pertinent surgical history. History reviewed. No pertinent family history. Social History     Socioeconomic History    Marital status:      Spouse name: Not on file    Number of children: Not on file    Years of education: Not on file    Highest education level: Not on file   Occupational History    Not on file   Social Needs    Financial resource strain: Not on file    Food insecurity:     Worry: Not on file     Inability: Not on file    Transportation needs:     Medical: Not on file     Non-medical: Not on file   Tobacco Use    Smoking status: Former Smoker     Last attempt to quit: 2006     Years since quittin.4    Smokeless tobacco: Never Used   Substance and Sexual Activity    Alcohol use:  Yes     Alcohol/week: 20.5 oz     Types: 14 Glasses of wine, 6 Cans of beer, 21 Standard drinks or equivalent per week     Comment: daily 2-3     Drug use: No    Sexual activity: Not on file   Lifestyle    Physical activity:     Days per week: Not on file     Minutes per session: Not on file    Stress: Not on file   Relationships    Social connections:     Talks on phone: Not on file     Gets together: Not on file     Attends Restorationist service: Not on file     Active member of club or organization: Not on file     Attends meetings of clubs or organizations: Not on file     Relationship status: Not on file    Intimate partner violence:     Fear of current or ex partner: Not on file     Emotionally abused: Not on file     Physically abused: Not on file     Forced sexual activity: Not on file   Other Topics Concern    Not on file   Social History Narrative    Not on file         ALLERGIES: Patient has no known allergies. Review of Systems   All other systems reviewed and are negative. Vitals:    06/19/19 1752 06/19/19 1822   BP: (!) 145/98 112/72   Pulse: (!) 121 90   Resp: 20 16   Temp: 98.2 °F (36.8 °C)    SpO2: 99% 98%   Weight: 63.5 kg (140 lb)    Height: 5' 10\" (1.778 m)             Physical Exam     GENERAL:The patient is thin, and well-hydrated. VITAL SIGNS: Heart rate, blood pressure, respiratory rate reviewed as recorded in  nurse's notes  EYES: Pupils reactive. Extraocular motion intact. No conjunctival redness or drainage. MOUTH/THROAT: Pharynx clear; airway patent. NECK: Supple, no meningeal signs. Trachea midline. No masses or thyromegaly. LUNGS: Breath sounds clear and equal bilaterally no accessory muscle use  CHEST: No deformity  CARDIOVASCULAR: Regular rate and rhythm  ABDOMEN: Soft with diffuse mild tenderness. No palpable masses or organomegaly. No peritoneal signs. No rigidity. EXTREMITIES: No clubbing or cyanosis. No joint swelling. Normal muscle tone. No  restricted range of motion appreciated. NEUROLOGIC: Sensation is grossly intact. Cranial nerve exam reveals face is  symmetrical, tongue is midline speech is clear. SKIN: No rash or petechiae. Good skin turgor palpated.   PSYCHIATRIC: Alert and oriented. Appropriate behavior and judgment. MDM  Number of Diagnoses or Management Options  Acute alcoholic gastritis, presence of bleeding unspecified:   Alcohol withdrawal syndrome with complication [facility]):   Non-intractable vomiting with nausea, unspecified vomiting type:   Diagnosis management comments: Alcohol intoxication, alcohol withdrawal, drug addiction, drug abuse, polysubstance  abuse,    Electrolyte abnormality, malnutrition, alcoholic ketoacidosis,    Wernicke's encephalopathy, vitamin deficiency, stimulant-induced psychosis,  Depression. .. Amount and/or Complexity of Data Reviewed  Clinical lab tests: reviewed and ordered  Tests in the medicine section of CPT®: ordered and reviewed      ED Course as of Jun 19 2030 Wed Jun 19, 2019 2025 The patient has had multiple episodes of vomiting since the medication and fluids and nausea medicines given in the emergency department. I talked to him about my concerns over his history of seizure with alcohol withdrawal.  The patient is agreeable with this plan. [KH]   2026 Case discussed with Dr. Yuliya Mauricio who will see the patient and put him in the hospital and on a Librium protocol due to his history of alcoholic seizures.     [KH]      ED Course User Index  [KH] Joe Mendez,        Procedures

## 2019-06-20 VITALS
OXYGEN SATURATION: 97 % | HEIGHT: 70 IN | RESPIRATION RATE: 16 BRPM | HEART RATE: 72 BPM | BODY MASS INDEX: 20.04 KG/M2 | SYSTOLIC BLOOD PRESSURE: 115 MMHG | WEIGHT: 140 LBS | TEMPERATURE: 99 F | DIASTOLIC BLOOD PRESSURE: 74 MMHG

## 2019-06-20 LAB
ALBUMIN SERPL-MCNC: 3.6 G/DL (ref 3.5–5)
ALBUMIN/GLOB SERPL: 1.6 {RATIO} (ref 1.2–3.5)
ALP SERPL-CCNC: 46 U/L (ref 50–136)
ALT SERPL-CCNC: 25 U/L (ref 12–65)
ANION GAP SERPL CALC-SCNC: 7 MMOL/L (ref 7–16)
AST SERPL-CCNC: 27 U/L (ref 15–37)
BASOPHILS # BLD: 0 K/UL (ref 0–0.2)
BASOPHILS NFR BLD: 1 % (ref 0–2)
BILIRUB SERPL-MCNC: 2 MG/DL (ref 0.2–1.1)
BUN SERPL-MCNC: 12 MG/DL (ref 6–23)
CALCIUM SERPL-MCNC: 8.4 MG/DL (ref 8.3–10.4)
CHLORIDE SERPL-SCNC: 104 MMOL/L (ref 98–107)
CO2 SERPL-SCNC: 29 MMOL/L (ref 21–32)
CREAT SERPL-MCNC: 0.72 MG/DL (ref 0.8–1.5)
DIFFERENTIAL METHOD BLD: ABNORMAL
EOSINOPHIL # BLD: 0.4 K/UL (ref 0–0.8)
EOSINOPHIL NFR BLD: 7 % (ref 0.5–7.8)
ERYTHROCYTE [DISTWIDTH] IN BLOOD BY AUTOMATED COUNT: 13.6 % (ref 11.9–14.6)
GLOBULIN SER CALC-MCNC: 2.3 G/DL (ref 2.3–3.5)
GLUCOSE BLD STRIP.AUTO-MCNC: 107 MG/DL (ref 65–100)
GLUCOSE BLD STRIP.AUTO-MCNC: 141 MG/DL (ref 65–100)
GLUCOSE BLD STRIP.AUTO-MCNC: 94 MG/DL (ref 65–100)
GLUCOSE SERPL-MCNC: 81 MG/DL (ref 65–100)
HCT VFR BLD AUTO: 40.1 % (ref 41.1–50.3)
HGB BLD-MCNC: 14.1 G/DL (ref 13.6–17.2)
IMM GRANULOCYTES # BLD AUTO: 0 K/UL (ref 0–0.5)
IMM GRANULOCYTES NFR BLD AUTO: 0 % (ref 0–5)
LYMPHOCYTES # BLD: 1.8 K/UL (ref 0.5–4.6)
LYMPHOCYTES NFR BLD: 33 % (ref 13–44)
MCH RBC QN AUTO: 33.3 PG (ref 26.1–32.9)
MCHC RBC AUTO-ENTMCNC: 35.2 G/DL (ref 31.4–35)
MCV RBC AUTO: 94.8 FL (ref 79.6–97.8)
MONOCYTES # BLD: 0.8 K/UL (ref 0.1–1.3)
MONOCYTES NFR BLD: 14 % (ref 4–12)
NEUTS SEG # BLD: 2.4 K/UL (ref 1.7–8.2)
NEUTS SEG NFR BLD: 45 % (ref 43–78)
NRBC # BLD: 0 K/UL (ref 0–0.2)
PLATELET # BLD AUTO: 157 K/UL (ref 150–450)
PMV BLD AUTO: 9.3 FL (ref 9.4–12.3)
POTASSIUM SERPL-SCNC: 3.7 MMOL/L (ref 3.5–5.1)
PROT SERPL-MCNC: 5.9 G/DL (ref 6.3–8.2)
RBC # BLD AUTO: 4.23 M/UL (ref 4.23–5.6)
SODIUM SERPL-SCNC: 140 MMOL/L (ref 136–145)
WBC # BLD AUTO: 5.4 K/UL (ref 4.3–11.1)

## 2019-06-20 PROCEDURE — 82962 GLUCOSE BLOOD TEST: CPT

## 2019-06-20 PROCEDURE — 74011250636 HC RX REV CODE- 250/636: Performed by: INTERNAL MEDICINE

## 2019-06-20 PROCEDURE — 74011250637 HC RX REV CODE- 250/637: Performed by: INTERNAL MEDICINE

## 2019-06-20 PROCEDURE — 96366 THER/PROPH/DIAG IV INF ADDON: CPT | Performed by: EMERGENCY MEDICINE

## 2019-06-20 PROCEDURE — 85025 COMPLETE CBC W/AUTO DIFF WBC: CPT

## 2019-06-20 PROCEDURE — 99218 HC RM OBSERVATION: CPT

## 2019-06-20 PROCEDURE — 80053 COMPREHEN METABOLIC PANEL: CPT

## 2019-06-20 PROCEDURE — 96366 THER/PROPH/DIAG IV INF ADDON: CPT

## 2019-06-20 PROCEDURE — 96365 THER/PROPH/DIAG IV INF INIT: CPT | Performed by: EMERGENCY MEDICINE

## 2019-06-20 PROCEDURE — 36415 COLL VENOUS BLD VENIPUNCTURE: CPT

## 2019-06-20 RX ORDER — TRAZODONE HYDROCHLORIDE 50 MG/1
150 TABLET ORAL
Status: DISCONTINUED | OUTPATIENT
Start: 2019-06-20 | End: 2019-06-20 | Stop reason: HOSPADM

## 2019-06-20 RX ORDER — PAROXETINE 10 MG/1
30 TABLET, FILM COATED ORAL DAILY
Status: ON HOLD | COMMUNITY
End: 2020-05-08

## 2019-06-20 RX ORDER — TRAZODONE HYDROCHLORIDE 150 MG/1
150 TABLET ORAL
COMMUNITY

## 2019-06-20 RX ADMIN — FOLIC ACID 1 MG: 1 TABLET ORAL at 08:23

## 2019-06-20 RX ADMIN — MULTIPLE VITAMINS W/ MINERALS TAB 1 TABLET: TAB at 08:22

## 2019-06-20 RX ADMIN — CHLORDIAZEPOXIDE HYDROCHLORIDE 50 MG: 25 CAPSULE ORAL at 08:23

## 2019-06-20 RX ADMIN — Medication 100 MG: at 08:23

## 2019-06-20 RX ADMIN — ENOXAPARIN SODIUM 40 MG: 40 INJECTION SUBCUTANEOUS at 08:22

## 2019-06-20 RX ADMIN — TRAZODONE HYDROCHLORIDE 150 MG: 50 TABLET ORAL at 01:20

## 2019-06-20 RX ADMIN — CHLORDIAZEPOXIDE HYDROCHLORIDE 50 MG: 25 CAPSULE ORAL at 16:24

## 2019-06-20 RX ADMIN — Medication 10 ML: at 14:00

## 2019-06-20 NOTE — H&P
HOSPITALIST H&P/CONSULT  NAME:  Brenda Mayes   Age:  45 y.o.  :   1980   MRN:   [id number]  PCP: Donavan Gibbs MD  Consulting MD:  Treatment Team: Attending Provider: Brielle Guillory; Primary Nurse: Sammi Alvarado RN  HPI:   44 yo CM with past history of alcohol abuse presents with alcohol intoxication and wanting detox. Patient had been sober for the past 6 months and then started drinking again a few weeks ago. He has been drinking 2-3 bottles of wine every day. He says that \"wanting to go to sleep\" and \"stress\" made him want to start drinking again. He says that he wants to stop drinking. He has tried multiple alcohol cessation programs with varying levels of success. He has had withdrawal symptoms from alcohol cessation before that have included seizures and DTs. He denies fevers/chills, chest pain, shortness of breath, abdominal pain, and diarrhea. He does have nausea without vomiting. ED Course: alcohol level of 121. CBC/BMP otherwise unremarkable except for mild hypokalemia of 3.4. Given 2L IVFs, thiamine/folate, famotidine, Zofran. Hospitalist called for admission. Complete ROS done and is as stated in HPI or otherwise negative  Past Medical History:   Diagnosis Date    Ill-defined condition     ETOH    Psychiatric disorder     anxiety/depression      History reviewed. No pertinent surgical history. Prior to Admission Medications   Prescriptions Last Dose Informant Patient Reported?  Taking?   chlordiazePOXIDE (LIBRIUM) 25 mg capsule Not Taking at Unknown time  No No   Sig: Take 2 tablets TID for 1 day  Then take 1 tablet TID on day 2  Then take 1 tablet  BID on day 3  Then take 1 tablet qhs on day 4      Facility-Administered Medications: None     No Known Allergies   Social History     Tobacco Use    Smoking status: Former Smoker     Last attempt to quit: 2006     Years since quittin.4    Smokeless tobacco: Never Used   Substance Use Topics    Alcohol use: Yes     Alcohol/week: 20.5 oz     Types: 14 Glasses of wine, 6 Cans of beer, 21 Standard drinks or equivalent per week     Comment: daily 2-3       History reviewed. No pertinent family history. Objective:     Visit Vitals  /72 (BP 1 Location: Right arm, BP Patient Position: At rest)   Pulse 90   Temp 98.2 °F (36.8 °C)   Resp 16   Ht 5' 10\" (1.778 m)   Wt 63.5 kg (140 lb)   SpO2 98%   BMI 20.09 kg/m²      Temp (24hrs), Av.2 °F (36.8 °C), Min:98.2 °F (36.8 °C), Max:98.2 °F (36.8 °C)    Oxygen Therapy  O2 Sat (%): 98 % (19)  O2 Device: Room air (19)  Physical Exam:  General:    Alert, cooperative, no distress, appears stated age. Head:   Normocephalic, without obvious abnormality, atraumatic. Eyes:  Scleral injection b/l  Nose:  Nares normal. No drainage or sinus tenderness. Lungs:   Clear to auscultation bilaterally. No Wheezing or Rhonchi. No rales. Heart:   Regular rate and rhythm,  no murmur, rub or gallop. Abdomen:   Soft, non-tender. Not distended. Bowel sounds normal.   Extremities: No cyanosis. No edema. No clubbing  Skin:     Texture, turgor normal. No rashes or lesions. Not Jaundiced  Neurologic: Alert and oriented x 3, no focal deficits   Data Review:   Recent Results (from the past 24 hour(s))   CBC WITH AUTOMATED DIFF    Collection Time: 19  6:21 PM   Result Value Ref Range    WBC 4.8 4.3 - 11.1 K/uL    RBC 5.10 4.23 - 5.6 M/uL    HGB 16.8 13.6 - 17.2 g/dL    HCT 46.6 41.1 - 50.3 %    MCV 91.4 79.6 - 97.8 FL    MCH 32.9 26.1 - 32.9 PG    MCHC 36.1 (H) 31.4 - 35.0 g/dL    RDW 13.3 11.9 - 14.6 %    PLATELET 099 257 - 344 K/uL    MPV 8.7 (L) 9.4 - 12.3 FL    ABSOLUTE NRBC 0.00 0.0 - 0.2 K/uL    DF AUTOMATED      NEUTROPHILS 28 (L) 43 - 78 %    LYMPHOCYTES 46 (H) 13 - 44 %    MONOCYTES 15 (H) 4.0 - 12.0 %    EOSINOPHILS 10 (H) 0.5 - 7.8 %    BASOPHILS 1 0.0 - 2.0 %    IMMATURE GRANULOCYTES 0 0.0 - 5.0 %    ABS. NEUTROPHILS 1.4 (L) 1.7 - 8.2 K/UL    ABS. LYMPHOCYTES 2.2 0.5 - 4.6 K/UL    ABS. MONOCYTES 0.7 0.1 - 1.3 K/UL    ABS. EOSINOPHILS 0.5 0.0 - 0.8 K/UL    ABS. BASOPHILS 0.1 0.0 - 0.2 K/UL    ABS. IMM. GRANS. 0.0 0.0 - 0.5 K/UL   METABOLIC PANEL, COMPREHENSIVE    Collection Time: 06/19/19  6:21 PM   Result Value Ref Range    Sodium 138 136 - 145 mmol/L    Potassium 3.4 (L) 3.5 - 5.1 mmol/L    Chloride 100 98 - 107 mmol/L    CO2 22 21 - 32 mmol/L    Anion gap 16 7 - 16 mmol/L    Glucose 120 (H) 65 - 100 mg/dL    BUN 8 6 - 23 MG/DL    Creatinine 0.85 0.8 - 1.5 MG/DL    GFR est AA >60 >60 ml/min/1.73m2    GFR est non-AA >60 >60 ml/min/1.73m2    Calcium 8.9 8.3 - 10.4 MG/DL    Bilirubin, total 1.0 0.2 - 1.1 MG/DL    ALT (SGPT) 32 12 - 65 U/L    AST (SGOT) 32 15 - 37 U/L    Alk. phosphatase 58 50 - 136 U/L    Protein, total 7.8 6.3 - 8.2 g/dL    Albumin 4.3 3.5 - 5.0 g/dL    Globulin 3.5 2.3 - 3.5 g/dL    A-G Ratio 1.2 1.2 - 3.5     LIPASE    Collection Time: 06/19/19  6:21 PM   Result Value Ref Range    Lipase 143 73 - 393 U/L   ETHYL ALCOHOL    Collection Time: 06/19/19  6:21 PM   Result Value Ref Range    ALCOHOL(ETHYL),SERUM 121 MG/DL     Imaging /Procedures /Studies     Assessment and Plan: Active Hospital Problems    Diagnosis Date Noted    Alcohol withdrawal ([facility] [address] 06/19/2019       PLAN    # Alcohol withdrawal, last drink was earlier this morning (drinks 2-3 bottles of wine daily)  - Ativan prn per CIWA protocol  - started scheduled Librium 50 mg TID  - seizure precautions  - ordered banana bags  - continue scheduled folate/thiamine  -  consult tomorrow for resources regarding alcohol cessation and support groups    F/E/N: banana bag, replete electrolytes as needed, cardiac diet    Ppx: Lovenox for VTE    Code Status: FULL CODE    Disposition: Admit to OBS with plan as above. Anticipated discharge in 24-48 hours. All questions answered.      Code Status:     Anticipated discharge:     Signed By: Valery Frias DO     June 19, 2019

## 2019-06-20 NOTE — PROGRESS NOTES
Care Management Interventions  PCP Verified by CM: No(referral sent to [facility])  Mode of Transport at Discharge: Other (see comment)  Transition of Care Consult (CM Consult): Other  Current Support Network: Own Home  Confirm Follow Up Transport: Family  Plan discussed with Pt/Family/Caregiver: Yes  Freedom of Choice Offered: Yes  Discharge Location  Discharge Placement: Home  Visited with pt regarding plans for discharge, pt states that he lives alone,but has a girlfriend. 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 HAs been drinking 2-3 bottles of wine for approx 3 wks straight. Was clean for 6 months, he was @ [facility] in [facility] and was released last month. Has also been to other in-patient rehab facilities with varying success stories. Admits to mod-heavy drinking for 10 yrs. Attributes the death of his mother in 2013, to the reason he started drinking more(she battled breast CA for 6yrs prior. Divorce and the loss of a job d/t alcohol, admits to drinking in the morning before work and while at work. States that he wants to stop but not willing to go anywhere nor does he want me to call [facility]. Knows that he is throwing his life away. States \"I used to have it all\". He has been to many meetings there and used to be in contact with [address]. New handout given form [facility], pt thanked me for talking/listening to him. Saw pt in interdisciplinarily rounds with the following disciplines: Physician, Case Management, Nursing, Dietary,Therapy and Pharmacy. The plan of care was discussed along with discharge date/ location. Pt will d/c in am of 6/21 if no seizure activity and after medication adjustments.

## 2019-06-20 NOTE — ROUTINE PROCESS
TRANSFER - OUT REPORT: 
 
Verbal report given to Lisa Leyva RN on Oneal Khan  being transferred to  for routine progression of care Report consisted of patients Situation, Background, Assessment and  
Recommendations(SBAR). Information from the following report(s) SBAR was reviewed with the receiving nurse. Lines:  
Peripheral IV 06/19/19 Left Antecubital (Active) Site Assessment Clean, dry, & intact 6/19/2019  6:21 PM  
Phlebitis Assessment 0 6/19/2019  6:21 PM  
Infiltration Assessment 0 6/19/2019  6:21 PM  
Dressing Status Clean, dry, & intact 6/19/2019  6:21 PM  
Dressing Type Transparent 6/19/2019  6:21 PM  
Hub Color/Line Status Pink 6/19/2019  6:21 PM  
Action Taken Blood drawn 6/19/2019  6:21 PM  
  
 
Opportunity for questions and clarification was provided. Patient transported with: 
 [place]

## 2019-06-20 NOTE — PROGRESS NOTES
Patient was under the impression that he was being discharged today by Dr. Praveen Bailey. Dr Praveen Bailey was called and informed, and she stated she wanted the pt to stay tonight due to past seizure activity and new meds or previous non compliant medication regimen. Patient insisted on leaving and he was informed about leaving AMA and the consequences of that decision and he still left AMA. Dr. Praveen Bailey was called and informed.

## 2019-06-20 NOTE — PROGRESS NOTES
Report received from off going RN. Shift assessment complete. Patient A&O x 4. Respirations present, even and unlabored. Breath sounds clear. Pt on RA. Heart sounds regular. Bowel sounds active in all 4 quadrants. Abdomen. ... soft and non tender. Pain. ....denies  Position. ...resting in bed  Potty. .... Gabrielle Combs ambulates to the bathroom    IV INT  Bed low and locked. Call light within reach. States he has the shakes this am no other withdraw symptoms.  States he had his last drink ([facility] [facility] [employer] [facility]) 6/19/19 @ ~ 8989

## 2019-06-20 NOTE — PROGRESS NOTES
TRANSFER - IN REPORT:    Verbal report received from BRAYDON Tinajero on Dierdre Night  being received from  Emergency Department   (unit) for routine progression of care      Report consisted of patients Situation, Background, Assessment and   Recommendations(SBAR). Information from the following report(s) ED Summary was reviewed with the receiving nurse. Opportunity for questions and clarification was provided. Assessment completed upon patients arrival to unit and care assumed.

## 2019-06-20 NOTE — PROGRESS NOTES
06/20/19 0004   Dual Skin Pressure Injury Assessment   Dual Skin Pressure Injury Assessment WDL   Second Care Provider (Based on [address] [place] [address]) Michael Carranza   Skin Integumentary   Skin Integumentary (WDL) WDL

## 2019-06-20 NOTE — PROGRESS NOTES
Progress Note    Patient: Brenda Mayes MRN: [id number]  SSN: [id number]    YOB: 1980  Age: 45 y.o. Sex: male      Admit Date: 6/19/2019    LOS: 0 days     Subjective:   F/U ETOH withdrawal    44 yo hx of ETOH abuse presented to the ED with ETOH intoxication wanting to detox. Hx of ETOH withdrawal seizures. Does not want to go to inpatient rehab. Has been drinking 2-3 bottles of wine every day for the past week. ETOH level 121. Patient states he is feeling well today but a little trimerous. No chest pain or SOB. Current Facility-Administered Medications   Medication Dose Route Frequency    traZODone (DESYREL) tablet 150 mg  150 mg Oral QHS    chlordiazePOXIDE (LIBRIUM) capsule 50 mg  50 mg Oral TID    sodium chloride (NS) flush 5-40 mL  5-40 mL IntraVENous Q8H    sodium chloride (NS) flush 5-40 mL  5-40 mL IntraVENous PRN    LORazepam (ATIVAN) injection 1 mg  1 mg IntraVENous O1Z PRN    folic acid (FOLVITE) tablet 1 mg  1 mg Oral DAILY    thiamine HCL (B-1) tablet 100 mg  100 mg Oral DAILY    multivitamin, tx-iron-ca-min (THERA-M w/ IRON) tablet 1 Tab  1 Tab Oral DAILY    enoxaparin (LOVENOX) injection 40 mg  40 mg SubCUTAneous Q24H       Objective:     Vitals:    06/20/19 0004 06/20/19 0337 06/20/19 0754 06/20/19 1142   BP: 124/88 103/70 109/69 147/63   Pulse: 80 78 68 87   Resp: 16 16 16 16   Temp: 99.1 °F (37.3 °C) 98 °F (36.7 °C) 96.9 °F (36.1 °C) 98.1 °F (36.7 °C)   SpO2: 98% 94% 98% 94%   Weight:       Height:             Intake and Output:  Current Shift: 06/20 0701 - 06/20 1900  In: 150 [P.O.:150]  Out: -   Last three shifts: No intake/output data recorded. Physical Exam:   General:  Alert, cooperative, no distress, appears stated age. Soft spoken. Eyes:  Conjunctivae/corneas clear. PERRL, EOMs intact. Ears:  Normal TMs and external ear canals both ears. Nose: Nares normal. Septum midline.     Mouth/Throat: Lips, mucosa, and tongue normal. Teeth and gums normal. Neck: no JVD. Back:   deferred   Lungs:   Clear to auscultation bilaterally. Heart:  Regular rate and rhythm, S1, S2 normal   Abdomen:   Soft, non-tender. Bowel sounds normal. No masses,  No organomegaly. Extremities: Extremities normal, atraumatic, no cyanosis or edema. Pulses: 2+ and symmetric all extremities. Skin: Skin color, texture, turgor normal. No rashes or lesions   Lymph nodes: Cervical, supraclavicular, and axillary nodes normal.   Neurologic: CNII-XII intact. Normal strength, sensation and reflexes throughout. Lab/Data Review:    Recent Results (from the past 24 hour(s))   CBC WITH AUTOMATED DIFF    Collection Time: 06/19/19  6:21 PM   Result Value Ref Range    WBC 4.8 4.3 - 11.1 K/uL    RBC 5.10 4.23 - 5.6 M/uL    HGB 16.8 13.6 - 17.2 g/dL    HCT 46.6 41.1 - 50.3 %    MCV 91.4 79.6 - 97.8 FL    MCH 32.9 26.1 - 32.9 PG    MCHC 36.1 (H) 31.4 - 35.0 g/dL    RDW 13.3 11.9 - 14.6 %    PLATELET 761 254 - 729 K/uL    MPV 8.7 (L) 9.4 - 12.3 FL    ABSOLUTE NRBC 0.00 0.0 - 0.2 K/uL    DF AUTOMATED      NEUTROPHILS 28 (L) 43 - 78 %    LYMPHOCYTES 46 (H) 13 - 44 %    MONOCYTES 15 (H) 4.0 - 12.0 %    EOSINOPHILS 10 (H) 0.5 - 7.8 %    BASOPHILS 1 0.0 - 2.0 %    IMMATURE GRANULOCYTES 0 0.0 - 5.0 %    ABS. NEUTROPHILS 1.4 (L) 1.7 - 8.2 K/UL    ABS. LYMPHOCYTES 2.2 0.5 - 4.6 K/UL    ABS. MONOCYTES 0.7 0.1 - 1.3 K/UL    ABS. EOSINOPHILS 0.5 0.0 - 0.8 K/UL    ABS. BASOPHILS 0.1 0.0 - 0.2 K/UL    ABS. IMM.  GRANS. 0.0 0.0 - 0.5 K/UL   METABOLIC PANEL, COMPREHENSIVE    Collection Time: 06/19/19  6:21 PM   Result Value Ref Range    Sodium 138 136 - 145 mmol/L    Potassium 3.4 (L) 3.5 - 5.1 mmol/L    Chloride 100 98 - 107 mmol/L    CO2 22 21 - 32 mmol/L    Anion gap 16 7 - 16 mmol/L    Glucose 120 (H) 65 - 100 mg/dL    BUN 8 6 - 23 MG/DL    Creatinine 0.85 0.8 - 1.5 MG/DL    GFR est AA >60 >60 ml/min/1.73m2    GFR est non-AA >60 >60 ml/min/1.73m2    Calcium 8.9 8.3 - 10.4 MG/DL    Bilirubin, total 1.0 0.2 - 1.1 MG/DL    ALT (SGPT) 32 12 - 65 U/L    AST (SGOT) 32 15 - 37 U/L    Alk. phosphatase 58 50 - 136 U/L    Protein, total 7.8 6.3 - 8.2 g/dL    Albumin 4.3 3.5 - 5.0 g/dL    Globulin 3.5 2.3 - 3.5 g/dL    A-G Ratio 1.2 1.2 - 3.5     LIPASE    Collection Time: 06/19/19  6:21 PM   Result Value Ref Range    Lipase 143 73 - 393 U/L   ETHYL ALCOHOL    Collection Time: 06/19/19  6:21 PM   Result Value Ref Range    ALCOHOL(ETHYL),SERUM 464 MG/DL   METABOLIC PANEL, COMPREHENSIVE    Collection Time: 06/20/19  5:36 AM   Result Value Ref Range    Sodium 140 136 - 145 mmol/L    Potassium 3.7 3.5 - 5.1 mmol/L    Chloride 104 98 - 107 mmol/L    CO2 29 21 - 32 mmol/L    Anion gap 7 7 - 16 mmol/L    Glucose 81 65 - 100 mg/dL    BUN 12 6 - 23 MG/DL    Creatinine 0.72 (L) 0.8 - 1.5 MG/DL    GFR est AA >60 >60 ml/min/1.73m2    GFR est non-AA >60 >60 ml/min/1.73m2    Calcium 8.4 8.3 - 10.4 MG/DL    Bilirubin, total 2.0 (H) 0.2 - 1.1 MG/DL    ALT (SGPT) 25 12 - 65 U/L    AST (SGOT) 27 15 - 37 U/L    Alk. phosphatase 46 (L) 50 - 136 U/L    Protein, total 5.9 (L) 6.3 - 8.2 g/dL    Albumin 3.6 3.5 - 5.0 g/dL    Globulin 2.3 2.3 - 3.5 g/dL    A-G Ratio 1.6 1.2 - 3.5     CBC WITH AUTOMATED DIFF    Collection Time: 06/20/19  5:36 AM   Result Value Ref Range    WBC 5.4 4.3 - 11.1 K/uL    RBC 4.23 4.23 - 5.6 M/uL    HGB 14.1 13.6 - 17.2 g/dL    HCT 40.1 (L) 41.1 - 50.3 %    MCV 94.8 79.6 - 97.8 FL    MCH 33.3 (H) 26.1 - 32.9 PG    MCHC 35.2 (H) 31.4 - 35.0 g/dL    RDW 13.6 11.9 - 14.6 %    PLATELET 850 699 - 612 K/uL    MPV 9.3 (L) 9.4 - 12.3 FL    ABSOLUTE NRBC 0.00 0.0 - 0.2 K/uL    DF AUTOMATED      NEUTROPHILS 45 43 - 78 %    LYMPHOCYTES 33 13 - 44 %    MONOCYTES 14 (H) 4.0 - 12.0 %    EOSINOPHILS 7 0.5 - 7.8 %    BASOPHILS 1 0.0 - 2.0 %    IMMATURE GRANULOCYTES 0 0.0 - 5.0 %    ABS. NEUTROPHILS 2.4 1.7 - 8.2 K/UL    ABS. LYMPHOCYTES 1.8 0.5 - 4.6 K/UL    ABS. MONOCYTES 0.8 0.1 - 1.3 K/UL    ABS. EOSINOPHILS 0.4 0.0 - 0.8 K/UL    ABS. BASOPHILS 0.0 0.0 - 0.2 K/UL    ABS. IMM. GRANS. 0.0 0.0 - 0.5 K/UL   GLUCOSE, POC    Collection Time: 06/20/19  7:56 AM   Result Value Ref Range    Glucose (POC) 94 65 - 100 mg/dL   GLUCOSE, POC    Collection Time: 06/20/19 11:45 AM   Result Value Ref Range    Glucose (POC) 107 (H) 65 - 100 mg/dL       Assessment/ Plan:     Principal Problem:    Alcohol withdrawal ([facility] [address] (6/19/2019)    ETOH withdrawal with hx of seizure - last intake on 6/19/19. Does not want to go to inpatient rehab. Librium 50mg TID. Folic acid. Thiamine. If does well today, plan to dc tomorrow     Depression - Restart Trazodone 150mg daily. Patient used to take Paxil but holding this since when taken with Trazodone can lower seizure threshold. Patient states he ran out of his medications over the past month and why he started drinking again.      DVT prophylaxis - Lovenox     Signed By: Clarence Huynh DO     June 20, 2019

## 2020-03-10 ENCOUNTER — HOSPITAL ENCOUNTER (EMERGENCY)
Age: 40
Discharge: HOME | End: 2020-03-11
Attending: EMERGENCY MEDICINE
Payer: COMMERCIAL

## 2020-03-10 DIAGNOSIS — F10.10 ALCOHOL ABUSE: Primary | ICD-10-CM

## 2020-03-10 DIAGNOSIS — R46.89 UNCOOPERATIVE BEHAVIOR: ICD-10-CM

## 2020-03-10 LAB
ALBUMIN SERPL-MCNC: 4.1 G/DL (ref 3.5–5)
ALBUMIN/GLOB SERPL: 1.1 {RATIO} (ref 1.2–3.5)
ALP SERPL-CCNC: 63 U/L (ref 50–136)
ALT SERPL-CCNC: 29 U/L (ref 12–65)
AMPHET UR QL SCN: NEGATIVE
ANION GAP SERPL CALC-SCNC: 17 MMOL/L (ref 7–16)
AST SERPL-CCNC: 89 U/L (ref 15–37)
BARBITURATES UR QL SCN: NEGATIVE
BASOPHILS # BLD: 0.1 K/UL (ref 0–0.2)
BASOPHILS NFR BLD: 1 % (ref 0–2)
BENZODIAZ UR QL: NEGATIVE
BILIRUB SERPL-MCNC: 0.9 MG/DL (ref 0.2–1.1)
BUN SERPL-MCNC: 12 MG/DL (ref 6–23)
CALCIUM SERPL-MCNC: 8.3 MG/DL (ref 8.3–10.4)
CANNABINOIDS UR QL SCN: NEGATIVE
CHLORIDE SERPL-SCNC: 97 MMOL/L (ref 98–107)
CO2 SERPL-SCNC: 21 MMOL/L (ref 21–32)
COCAINE UR QL SCN: NEGATIVE
CREAT SERPL-MCNC: 0.81 MG/DL (ref 0.8–1.5)
DIFFERENTIAL METHOD BLD: ABNORMAL
EOSINOPHIL # BLD: 0.6 K/UL (ref 0–0.8)
EOSINOPHIL NFR BLD: 10 % (ref 0.5–7.8)
ERYTHROCYTE [DISTWIDTH] IN BLOOD BY AUTOMATED COUNT: 14.2 % (ref 11.9–14.6)
ETHANOL SERPL-MCNC: >300 MG/DL
GLOBULIN SER CALC-MCNC: 3.9 G/DL (ref 2.3–3.5)
GLUCOSE SERPL-MCNC: 86 MG/DL (ref 65–100)
HCT VFR BLD AUTO: 50.5 % (ref 41.1–50.3)
HGB BLD-MCNC: 17.7 G/DL (ref 13.6–17.2)
IMM GRANULOCYTES # BLD AUTO: 0 K/UL (ref 0–0.5)
IMM GRANULOCYTES NFR BLD AUTO: 0 % (ref 0–5)
LACTATE SERPL-SCNC: 10.5 MMOL/L (ref 0.4–2)
LIPASE SERPL-CCNC: 60 U/L (ref 73–393)
LYMPHOCYTES # BLD: 2.7 K/UL (ref 0.5–4.6)
LYMPHOCYTES NFR BLD: 45 % (ref 13–44)
MCH RBC QN AUTO: 31.3 PG (ref 26.1–32.9)
MCHC RBC AUTO-ENTMCNC: 35 G/DL (ref 31.4–35)
MCV RBC AUTO: 89.2 FL (ref 79.6–97.8)
METHADONE UR QL: NEGATIVE
MONOCYTES # BLD: 0.7 K/UL (ref 0.1–1.3)
MONOCYTES NFR BLD: 11 % (ref 4–12)
NEUTS SEG # BLD: 1.9 K/UL (ref 1.7–8.2)
NEUTS SEG NFR BLD: 33 % (ref 43–78)
NRBC # BLD: 0 K/UL (ref 0–0.2)
OPIATES UR QL: NEGATIVE
PCP UR QL: NEGATIVE
PLATELET # BLD AUTO: 170 K/UL (ref 150–450)
PMV BLD AUTO: 10.5 FL (ref 9.4–12.3)
POTASSIUM SERPL-SCNC: 3.7 MMOL/L (ref 3.5–5.1)
POTASSIUM SERPL-SCNC: 6.2 MMOL/L (ref 3.5–5.1)
PROT SERPL-MCNC: 8 G/DL (ref 6.3–8.2)
RBC # BLD AUTO: 5.66 M/UL (ref 4.23–5.6)
SODIUM SERPL-SCNC: 135 MMOL/L (ref 136–145)
WBC # BLD AUTO: 5.9 K/UL (ref 4.3–11.1)

## 2020-03-10 PROCEDURE — 96361 HYDRATE IV INFUSION ADD-ON: CPT

## 2020-03-10 PROCEDURE — 74011250636 HC RX REV CODE- 250/636: Performed by: EMERGENCY MEDICINE

## 2020-03-10 PROCEDURE — 99285 EMERGENCY DEPT VISIT HI MDM: CPT

## 2020-03-10 PROCEDURE — 81003 URINALYSIS AUTO W/O SCOPE: CPT

## 2020-03-10 PROCEDURE — 93005 ELECTROCARDIOGRAM TRACING: CPT | Performed by: EMERGENCY MEDICINE

## 2020-03-10 PROCEDURE — 96374 THER/PROPH/DIAG INJ IV PUSH: CPT

## 2020-03-10 PROCEDURE — 80307 DRUG TEST PRSMV CHEM ANLYZR: CPT

## 2020-03-10 PROCEDURE — 96375 TX/PRO/DX INJ NEW DRUG ADDON: CPT

## 2020-03-10 PROCEDURE — 83605 ASSAY OF LACTIC ACID: CPT

## 2020-03-10 PROCEDURE — 80053 COMPREHEN METABOLIC PANEL: CPT

## 2020-03-10 PROCEDURE — 84132 ASSAY OF SERUM POTASSIUM: CPT

## 2020-03-10 PROCEDURE — 85025 COMPLETE CBC W/AUTO DIFF WBC: CPT

## 2020-03-10 PROCEDURE — 83690 ASSAY OF LIPASE: CPT

## 2020-03-10 RX ORDER — SODIUM CHLORIDE 9 MG/ML
1000 INJECTION, SOLUTION INTRAVENOUS ONCE
Status: COMPLETED | OUTPATIENT
Start: 2020-03-10 | End: 2020-03-11

## 2020-03-10 RX ORDER — THIAMINE HYDROCHLORIDE 100 MG/ML
500 INJECTION, SOLUTION INTRAMUSCULAR; INTRAVENOUS
Status: COMPLETED | OUTPATIENT
Start: 2020-03-10 | End: 2020-03-10

## 2020-03-10 RX ORDER — LORAZEPAM 2 MG/ML
2 INJECTION INTRAMUSCULAR
Status: COMPLETED | OUTPATIENT
Start: 2020-03-10 | End: 2020-03-10

## 2020-03-10 RX ORDER — DEXTROSE, SODIUM CHLORIDE, SODIUM LACTATE, POTASSIUM CHLORIDE, AND CALCIUM CHLORIDE 5; .6; .31; .03; .02 G/100ML; G/100ML; G/100ML; G/100ML; G/100ML
150 INJECTION, SOLUTION INTRAVENOUS CONTINUOUS
Status: DISCONTINUED | OUTPATIENT
Start: 2020-03-10 | End: 2020-03-11 | Stop reason: HOSPADM

## 2020-03-10 RX ADMIN — THIAMINE HYDROCHLORIDE 500 MG: 100 INJECTION, SOLUTION INTRAMUSCULAR; INTRAVENOUS at 21:47

## 2020-03-10 RX ADMIN — LORAZEPAM 2 MG: 2 INJECTION INTRAMUSCULAR; INTRAVENOUS at 21:47

## 2020-03-10 RX ADMIN — SODIUM CHLORIDE, SODIUM LACTATE, POTASSIUM CHLORIDE, CALCIUM CHLORIDE, AND DEXTROSE MONOHYDRATE 150 ML/HR: 600; 310; 30; 20; 5 INJECTION, SOLUTION INTRAVENOUS at 22:18

## 2020-03-10 RX ADMIN — SODIUM CHLORIDE 1000 ML: 900 INJECTION, SOLUTION INTRAVENOUS at 21:48

## 2020-03-11 VITALS
RESPIRATION RATE: 14 BRPM | DIASTOLIC BLOOD PRESSURE: 88 MMHG | WEIGHT: 130 LBS | SYSTOLIC BLOOD PRESSURE: 137 MMHG | OXYGEN SATURATION: 97 % | HEART RATE: 120 BPM | HEIGHT: 72 IN | TEMPERATURE: 98.9 F | BODY MASS INDEX: 17.61 KG/M2

## 2020-03-11 LAB
ATRIAL RATE: 104 BPM
CALCULATED P AXIS, ECG09: 71 DEGREES
CALCULATED R AXIS, ECG10: 49 DEGREES
CALCULATED T AXIS, ECG11: 75 DEGREES
DIAGNOSIS, 93000: NORMAL
P-R INTERVAL, ECG05: 122 MS
Q-T INTERVAL, ECG07: 326 MS
QRS DURATION, ECG06: 92 MS
QTC CALCULATION (BEZET), ECG08: 428 MS
VENTRICULAR RATE, ECG03: 104 BPM

## 2020-03-11 NOTE — ED NOTES
Pt refusing to keep monitor leads on. Pt refusing to stay in bed. Pt getting up and drinking water from the sink despite being educated on seizure and fall risks.

## 2020-03-11 NOTE — ED TRIAGE NOTES
Here via [facility] for seizure activity. Hx withdrawal seizures. Was checking into [facility]oh rehab when had seizure activity. 3 episodes witnessed by ems. On arrival, pt seems to be having uncontrollable twitching of extremeties however remains alert and oriented entire time. States drank 4 bottles of wine this am and his usual daily meds however has been taking percocet for past 3 days as well. States hasn't eaten in past 10 days due to binge-drinking. Denies pain, c/o nausea/vomiting. Given zofran 4mg iv per ems.

## 2020-03-11 NOTE — ED PROVIDER NOTES
[facility] Emergency Department  Arrival Date/Time: 3/10/2020 @  9:02 PM      Jennifer Orenlas  MRN: [id number]      44 y.o. male    YOB: 1980   [phone] (home)     [facility] EMERGENCY DEPT ERA/ A  Seen on 3/11/2020 @ 9:41 PM      Today's Chief Complaint:   Chief Complaint   Patient presents with    Seizure     HPI: 66-year-old male with 20-year history of alcohol abuse presents to the emergency department from the [facility]. Patient states he went there tonight seeking help getting off alcohol. He has been drinking up to 4 bottles of wine a day for the last several days. States he has not had any food in almost 10 days    While he was in the lobby of the [facility] for seizure. EMS was called and he was transferred here for evaluation    On arrival he is awake alert oriented answering questions following commands. He is tremulous. Tachycardic. Ill-appearing. States he feels terrible. Nothing makes him feel better. Is been getting progressively worse    He has been in the [facility] several times both for alcohol and for depression    He has attempted suicide in the past most recently in [place]. While he was there he wrapped a blanket around his head hung it from the door but they found him. He also pulled a patient belonging bag over his head    He states he is not having any of those thoughts today that he wants to get better and he wants to live. HPI    Review of Systems: Review of Systems   Constitutional: Positive for appetite change, diaphoresis and fatigue. Negative for activity change, chills and fever. HENT: Negative. Eyes: Negative. Respiratory: Negative. Cardiovascular: Negative. Gastrointestinal: Positive for abdominal pain. Genitourinary: Negative. Musculoskeletal: Negative. Skin: Negative. Neurological: Positive for dizziness, tremors and weakness. Psychiatric/Behavioral: Negative.         Past Medical History: Primary Care Doctor: Taniya Bray MD  Meds, PMH, PSHx, SocHx at end of this note     Allergies: No Known Allergies      Key Anti-Platelet Anticoagulant Meds     The patient is on no antiplatelet meds or anticoagulants. Physical Exam:  Nursing documentation reviewed. Patient Vitals for the past 24 hrs:   Temp Pulse Resp BP SpO2   03/10/20 2205     94 %   03/10/20 2202  (!) 110  134/86    03/10/20 2157  (!) 112 18  96 %   03/10/20 2155  (!) 119 (!) 48  96 %   03/10/20 2142  (!) 140 (!) 45 124/83 95 %   03/10/20 2112  (!) 111 (!) 36  94 %   03/10/20 2111 99.1 °F (37.3 °C) (!) 107 (!) 32 (!) 146/91 94 %     Vital signs were reviewed. Physical Exam  Vitals signs and nursing note reviewed. Constitutional:       Appearance: He is ill-appearing. HENT:      Head: Normocephalic and atraumatic. Mouth/Throat:      Mouth: Mucous membranes are moist.   Neck:      Musculoskeletal: Normal range of motion. Cardiovascular:      Rate and Rhythm: Regular rhythm. Tachycardia present. Pulses: Normal pulses. Heart sounds: No murmur. Pulmonary:      Effort: No respiratory distress. Breath sounds: Normal breath sounds. No wheezing. Abdominal:      General: There is no distension. Tenderness: There is no abdominal tenderness. Musculoskeletal: Normal range of motion. Skin:     General: Skin is warm. Capillary Refill: Capillary refill takes less than 2 seconds. Neurological:      Mental Status: He is alert and oriented to person, place, and time. Cranial Nerves: No cranial nerve deficit. Psychiatric:         Attention and Perception: Attention and perception normal. He does not perceive auditory or visual hallucinations. Mood and Affect: Mood is depressed. Affect is flat. Speech: Speech normal.         Behavior: Behavior is cooperative. Thought Content:  Thought content normal.         Cognition and Memory: Cognition normal. MEDICAL DECISION MAKING:   Differential Diagnosis:    MDM  Number of Diagnoses or Management Options  Diagnosis management comments: 68-year-old male with long history of alcohol abuse presents to the emergency department after a seizure presumably from alcohol withdrawal although his blood alcohol today is greater than 300    He does have other signs of withdrawal including tachycardia tremor diaphoresis    Will give him some Ativan thiamine fluids    Patient is uncooperative with nursing staff he pulled his IV loose would not allow nurses to complete the medications. Patient disappeared several times security found him out walking the outside smoking. He stated that if he did not get allowed to smoke he was going to leave. He was told he was not being held here against his will but he cannot smoke on the property and cannot leave the property and come back as a patient he needs to be discharged. So he was    He states has not eaten anything since last Saturday    Differential includes alcoholic ketoacidosis, starvation acidosis, alcohol intoxication, alcohol withdrawal seizure, seizures.        Amount and/or Complexity of Data Reviewed  Clinical lab tests: ordered and reviewed  Decide to obtain previous medical records or to obtain history from someone other than the patient: yes  Review and summarize past medical records: yes    Risk of Complications, Morbidity, and/or Mortality  Presenting problems: high  Diagnostic procedures: minimal  Management options: high          Data/Management:    Lab findings during this visit:   Recent Results (from the past 48 hour(s))   EKG, 12 LEAD, INITIAL    Collection Time: 03/10/20  9:09 PM   Result Value Ref Range    Ventricular Rate 104 BPM    Atrial Rate 104 BPM    P-R Interval 122 ms    QRS Duration 92 ms    Q-T Interval 326 ms    QTC Calculation (Bezet) 428 ms    Calculated P Axis 71 degrees    Calculated R Axis 49 degrees    Calculated T Axis 75 degrees Diagnosis       !! AGE AND GENDER SPECIFIC ECG ANALYSIS !! Sinus tachycardia  T wave abnormality, consider anterior ischemia  Abnormal ECG  When compared with ECG of 02-JAN-2014 16:57,  T wave inversion now evident in Anterior leads  QT has shortened     CBC WITH AUTOMATED DIFF    Collection Time: 03/10/20  9:21 PM   Result Value Ref Range    WBC 5.9 4.3 - 11.1 K/uL    RBC 5.66 (H) 4.23 - 5.6 M/uL    HGB 17.7 (H) 13.6 - 17.2 g/dL    HCT 50.5 (H) 41.1 - 50.3 %    MCV 89.2 79.6 - 97.8 FL    MCH 31.3 26.1 - 32.9 PG    MCHC 35.0 31.4 - 35.0 g/dL    RDW 14.2 11.9 - 14.6 %    PLATELET 746 406 - 058 K/uL    MPV 10.5 9.4 - 12.3 FL    ABSOLUTE NRBC 0.00 0.0 - 0.2 K/uL    DF AUTOMATED      NEUTROPHILS 33 (L) 43 - 78 %    LYMPHOCYTES 45 (H) 13 - 44 %    MONOCYTES 11 4.0 - 12.0 %    EOSINOPHILS 10 (H) 0.5 - 7.8 %    BASOPHILS 1 0.0 - 2.0 %    IMMATURE GRANULOCYTES 0 0.0 - 5.0 %    ABS. NEUTROPHILS 1.9 1.7 - 8.2 K/UL    ABS. LYMPHOCYTES 2.7 0.5 - 4.6 K/UL    ABS. MONOCYTES 0.7 0.1 - 1.3 K/UL    ABS. EOSINOPHILS 0.6 0.0 - 0.8 K/UL    ABS. BASOPHILS 0.1 0.0 - 0.2 K/UL    ABS. IMM. GRANS. 0.0 0.0 - 0.5 K/UL   METABOLIC PANEL, COMPREHENSIVE    Collection Time: 03/10/20  9:21 PM   Result Value Ref Range    Sodium 135 (L) 136 - 145 mmol/L    Potassium 6.2 (HH) 3.5 - 5.1 mmol/L    Chloride 97 (L) 98 - 107 mmol/L    CO2 21 21 - 32 mmol/L    Anion gap 17 (H) 7 - 16 mmol/L    Glucose 86 65 - 100 mg/dL    BUN 12 6 - 23 MG/DL    Creatinine 0.81 0.8 - 1.5 MG/DL    GFR est AA >60 >60 ml/min/1.73m2    GFR est non-AA >60 >60 ml/min/1.73m2    Calcium 8.3 8.3 - 10.4 MG/DL    Bilirubin, total 0.9 0.2 - 1.1 MG/DL    ALT (SGPT) 29 12 - 65 U/L    AST (SGOT) 89 (H) 15 - 37 U/L    Alk.  phosphatase 63 50 - 136 U/L    Protein, total 8.0 6.3 - 8.2 g/dL    Albumin 4.1 3.5 - 5.0 g/dL    Globulin 3.9 (H) 2.3 - 3.5 g/dL    A-G Ratio 1.1 (L) 1.2 - 3.5     ETHYL ALCOHOL    Collection Time: 03/10/20  9:21 PM   Result Value Ref Range    ALCOHOL(ETHYL),SERUM >300 MG/DL   LIPASE    Collection Time: 03/10/20  9:21 PM   Result Value Ref Range    Lipase 60 (L) 73 - 393 U/L   DRUG SCREEN, URINE    Collection Time: 03/10/20  9:30 PM   Result Value Ref Range    PCP(PHENCYCLIDINE) NEGATIVE       BENZODIAZEPINES NEGATIVE       COCAINE NEGATIVE       AMPHETAMINES NEGATIVE       METHADONE NEGATIVE       THC (TH-CANNABINOL) NEGATIVE       OPIATES NEGATIVE       BARBITURATES NEGATIVE      LACTIC ACID    Collection Time: 03/10/20  9:58 PM   Result Value Ref Range    Lactic acid 10.5 (HH) 0.4 - 2.0 MMOL/L   POTASSIUM    Collection Time: 03/10/20 10:19 PM   Result Value Ref Range    Potassium 3.7 3.5 - 5.1 mmol/L     Lactate is noted to be elevated at 10.5. He is not febrile. There is no cough. No shortness of breath. No abdominal pain. He has no signs of an infection I do not believe that his elevated lactate is secondary to an infection instead is likely secondary to the alcohol abuse and seizure activity. Will reassess him continue to observe if he develops fever or other signs of infection would consider obtaining blood cultures at that time. Medications given in the ED:   Medications   0.9% sodium chloride infusion 1,000 mL (1,000 mL IntraVENous New Bag 3/10/20 2148)     Followed by   dextrose 5% lactated ringers infusion (150 mL/hr IntraVENous New Bag 3/10/20 2218)   thiamine (B-1) injection 500 mg (500 mg IntraVENous Given 3/10/20 2147)   LORazepam (ATIVAN) injection 2 mg (2 mg IntraVENous Given 3/10/20 2147)       Recheck and Additional Documentation:  (use .addrecheck  . addsepsis   . addstroke   . addhip  . addhandoff  . addcctime)     Check at 1132. Patient is resting comfortably. Tremors have resolved. Heart rate is come down. His labs are reviewed. We will recheck his lactic acid as well as his blood alcohol at 6 AM.  Continue hydration throughout the night. He states that [facility] said he could come there when we finished our evaluation.      Procedure Documentation:   Procedures     Other ED Course Notes:     ED Course as of Mar 11 0033   Tue Mar 10, 2020   2230 ALCOHOL(ETHYL),SERUM: >300 [GH]   2250 Potassium: 3.7 [GH]   2250 Lactic acid(!!): 10.5 [GH]   2250 WBC: 5.9 [GH]   2250 HGB(!): 17.7 [GH]   2250 Lipase(!): 60 [GH]      ED Course User Index  [GH] Adrienne Broussard MD       Assessment and Plan:    Impression: No diagnosis found. Disposition:    Follow-up:   Follow-up Information    None       DC Med:   Current Discharge Medication List          Past Medical History:      Past Medical History:   Diagnosis Date    Ill-defined condition     ETOH    Psychiatric disorder     anxiety/depression     No past surgical history on file. Social History     Tobacco Use    Smoking status: Former Smoker     Last attempt to quit: 2006     Years since quittin.1    Smokeless tobacco: Never Used   Substance Use Topics    Alcohol use: Yes     Alcohol/week: 34.2 standard drinks     Types: 14 Glasses of wine, 6 Cans of beer, 21 Standard drinks or equivalent per week     Comment: daily 2-3     Drug use: No     Prior to Admission Medications   Prescriptions Last Dose Informant Patient Reported? Taking? PARoxetine (PAXIL) 10 mg tablet   Yes No   Sig: Take  by mouth daily. Indications: PT. UNSURE OF THE DOSAGE, NEEDS NEW RX AT D/C   chlordiazePOXIDE (LIBRIUM) 25 mg capsule   No No   Sig: Take 2 tablets TID for 1 day  Then take 1 tablet TID on day 2  Then take 1 tablet  BID on day 3  Then take 1 tablet qhs on day 4   Patient taking differently: Take 2 tablets TID for 1 day  Then take 1 tablet TID on day 2  Then take 1 tablet  BID on day 3  Then take 1 tablet qhs on day 4  Indications: PT NEVER STARTED TAKING LIBRIUM AT HOME.   traZODone (DESYREL) 150 mg tablet   Yes No   Sig: Take 150 mg by mouth nightly. Indications: PT. NEEDS NEW PRESCRIPTION AT D/C.       Facility-Administered Medications: None

## 2020-03-11 NOTE — DISCHARGE INSTRUCTIONS
Patient Education        Learning About Alcohol Use Disorder  What is alcohol use disorder? Alcohol use disorder means that a person drinks alcohol even though it causes harm to themselves or others. It can range from mild to severe. The more signs of this disorder you have, the more severe it may be. Moderate to severe alcohol use disorder is sometimes called addiction. People who have it may find it hard to control their use of alcohol. People who have this disorder may argue with others about how much they're drinking. Their job may be affected because of drinking. They may drink when it's dangerous or illegal, such as when they drive. They also may have a strong need, or craving, to drink. They may feel like they must drink just to get by. Their drinking may increase their risk of getting hurt or being in a car crash. Over time, drinking too much alcohol may cause health problems. These may include high blood pressure, liver problems, or problems with digestion. What are the signs? Maybe you've wondered about your alcohol habits, or how to tell if your drinking is becoming a problem. Here are some of the signs of alcohol use disorder. You may have it if you have two or more of the following signs:  · You drink larger amounts of alcohol than you ever meant to. Or you've been drinking for a longer time than you ever meant to. · You can't cut down or control your use. Or you constantly wish you could cut down. · You spend a lot of time getting or drinking alcohol or recovering from its effects. · You have strong cravings for alcohol. · You can no longer do your main jobs at work, at school, or at home. · You keep drinking alcohol, even though your use hurts your relationships. · You have stopped doing important activities because of your alcohol use. · You drink alcohol in situations where doing so is dangerous. · You keep drinking alcohol even though you know it's causing health problems.   · You need more and more alcohol to get the same effect, or you get less effect from the same amount over time. This is called tolerance. · You have uncomfortable symptoms when you stop drinking alcohol or use less. This is called withdrawal.  Alcohol use disorder can range from mild to severe. The more signs you have, the more severe the disorder may be. Moderate to severe alcohol use disorder is sometimes called addiction. You might not realize that your drinking is a problem. You might not drink large amounts when you drink. Or you might go for days or weeks between drinking episodes. But even if you don't drink very often, your drinking could still be harmful and put you at risk. How is alcohol use disorder treated? Getting help is up to you. But you don't have to do it alone. There are many people and kinds of treatments that can help. Treatment for alcohol use disorder can include:  · Group therapy, one or more types of counseling, and alcohol education. · Medicines that help to:  ? Reduce withdrawal symptoms and help you safely stop drinking. ? Reduce cravings for alcohol. · Support groups. These groups include Alcoholics Anonymous and [employer] (Self-Management and Recovery Training). Some people are able to stop or cut back on drinking with help from a counselor. People who have moderate to severe alcohol use disorder may need medical treatment. They may need to stay in a hospital or treatment center. You may have a treatment team to help you. This team may include a psychologist or psychiatrist, counselors, doctors, social workers, nurses, and a . A  helps plan and manage your treatment. Follow-up care is a key part of your treatment and safety. Be sure to make and go to all appointments, and call your doctor if you are having problems. It's also a good idea to know your test results and keep a list of the medicines you take. Where can you learn more?   Go to http://hortencia.info/. Enter [id number] in the search box to learn more about \"Learning About Alcohol Use Disorder. \"  Current as of: February 5, 2019  Content Version: 12.2  © 1948-1788 [employer]. Care instructions adapted under license by [employer] (which disclaims liability or warranty for this information). If you have questions about a medical condition or this instruction, always ask your healthcare professional. [place] [address] any warranty or liability for your use of this information.

## 2020-03-11 NOTE — ED NOTES
I have reviewed discharge instructions with the patient. The patient verbalized understanding. Patient left ED via Discharge Method: ambulatory to Home with self. Opportunity for questions and clarification provided. Patient given 0 scripts. To continue your aftercare when you leave the hospital, you may receive an automated call from our care team to check in on how you are doing. This is a free service and part of our promise to provide the best care and service to meet your aftercare needs.  If you have questions, or wish to unsubscribe from this service please call [phone]. Thank you for Choosing our [facility] Emergency Department.

## 2020-05-07 ENCOUNTER — HOSPITAL ENCOUNTER (EMERGENCY)
Age: 40
Discharge: HOME | End: 2020-05-07
Attending: EMERGENCY MEDICINE
Payer: COMMERCIAL

## 2020-05-07 ENCOUNTER — HOSPITAL ENCOUNTER (OUTPATIENT)
Age: 40
Setting detail: OBSERVATION
Discharge: PSYCHIATRIC OR SUBSTANCE USE FACILITY | End: 2020-05-08
Attending: FAMILY MEDICINE | Admitting: FAMILY MEDICINE
Payer: COMMERCIAL

## 2020-05-07 ENCOUNTER — APPOINTMENT (OUTPATIENT)
Dept: GENERAL RADIOLOGY | Age: 40
End: 2020-05-07
Attending: EMERGENCY MEDICINE
Payer: COMMERCIAL

## 2020-05-07 VITALS
OXYGEN SATURATION: 99 % | DIASTOLIC BLOOD PRESSURE: 88 MMHG | TEMPERATURE: 98.5 F | HEART RATE: 114 BPM | WEIGHT: 140 LBS | SYSTOLIC BLOOD PRESSURE: 138 MMHG | RESPIRATION RATE: 16 BRPM | HEIGHT: 72 IN | BODY MASS INDEX: 18.96 KG/M2

## 2020-05-07 DIAGNOSIS — F10.939 ALCOHOL WITHDRAWAL SYNDROME WITH COMPLICATION (HCC): Primary | ICD-10-CM

## 2020-05-07 DIAGNOSIS — E86.0 DEHYDRATION: ICD-10-CM

## 2020-05-07 DIAGNOSIS — R11.2 INTRACTABLE NAUSEA AND VOMITING: ICD-10-CM

## 2020-05-07 PROBLEM — D72.829 LEUKOCYTOSIS: Status: ACTIVE | Noted: 2020-05-07

## 2020-05-07 LAB
ALBUMIN SERPL-MCNC: 4.4 G/DL (ref 3.5–5)
ALBUMIN/GLOB SERPL: 1 {RATIO} (ref 1.2–3.5)
ALP SERPL-CCNC: 69 U/L (ref 50–136)
ALT SERPL-CCNC: 38 U/L (ref 12–65)
AMPHET UR QL SCN: NEGATIVE
ANION GAP SERPL CALC-SCNC: 23 MMOL/L (ref 7–16)
ANION GAP SERPL CALC-SCNC: 25 MMOL/L (ref 7–16)
APPEARANCE UR: CLEAR
ARTERIAL PATENCY WRIST A: ABNORMAL
AST SERPL-CCNC: 50 U/L (ref 15–37)
BACTERIA URNS QL MICRO: 0 /HPF
BARBITURATES UR QL SCN: NEGATIVE
BASE DEFICIT BLDV-SCNC: 7 MMOL/L
BASOPHILS # BLD: 0.1 K/UL (ref 0–0.2)
BASOPHILS NFR BLD: 1 % (ref 0–2)
BDY SITE: ABNORMAL
BENZODIAZ UR QL: NEGATIVE
BILIRUB SERPL-MCNC: 0.4 MG/DL (ref 0.2–1.1)
BILIRUB UR QL: NEGATIVE
BUN SERPL-MCNC: 15 MG/DL (ref 6–23)
BUN SERPL-MCNC: 16 MG/DL (ref 6–23)
CALCIUM SERPL-MCNC: 8.7 MG/DL (ref 8.3–10.4)
CALCIUM SERPL-MCNC: 8.7 MG/DL (ref 8.3–10.4)
CANNABINOIDS UR QL SCN: NEGATIVE
CASTS URNS QL MICRO: ABNORMAL /LPF
CHLORIDE SERPL-SCNC: 101 MMOL/L (ref 98–107)
CHLORIDE SERPL-SCNC: 98 MMOL/L (ref 98–107)
CO2 BLD-SCNC: 15 MMOL/L
CO2 SERPL-SCNC: 12 MMOL/L (ref 21–32)
CO2 SERPL-SCNC: 14 MMOL/L (ref 21–32)
COCAINE UR QL SCN: NEGATIVE
COLOR UR: YELLOW
CREAT SERPL-MCNC: 0.82 MG/DL (ref 0.8–1.5)
CREAT SERPL-MCNC: 1.09 MG/DL (ref 0.8–1.5)
DIFFERENTIAL METHOD BLD: ABNORMAL
EOSINOPHIL # BLD: 0 K/UL (ref 0–0.8)
EOSINOPHIL NFR BLD: 0 % (ref 0.5–7.8)
EPI CELLS #/AREA URNS HPF: ABNORMAL /HPF
ERYTHROCYTE [DISTWIDTH] IN BLOOD BY AUTOMATED COUNT: 13.9 % (ref 11.9–14.6)
ETHANOL SERPL-MCNC: 184 MG/DL
GAS FLOW.O2 O2 DELIVERY SYS: ABNORMAL L/MIN
GLOBULIN SER CALC-MCNC: 4.2 G/DL (ref 2.3–3.5)
GLUCOSE SERPL-MCNC: 164 MG/DL (ref 65–100)
GLUCOSE SERPL-MCNC: 166 MG/DL (ref 65–100)
GLUCOSE UR STRIP.AUTO-MCNC: 250 MG/DL
HCO3 BLDV-SCNC: 14.5 MMOL/L (ref 23–28)
HCT VFR BLD AUTO: 51 % (ref 41.1–50.3)
HGB BLD-MCNC: 18.1 G/DL (ref 13.6–17.2)
HGB UR QL STRIP: ABNORMAL
IMM GRANULOCYTES # BLD AUTO: 0.1 K/UL (ref 0–0.5)
IMM GRANULOCYTES NFR BLD AUTO: 1 % (ref 0–5)
KETONES UR QL STRIP.AUTO: >80 MG/DL
LEUKOCYTE ESTERASE UR QL STRIP.AUTO: NEGATIVE
LYMPHOCYTES # BLD: 1.3 K/UL (ref 0.5–4.6)
LYMPHOCYTES NFR BLD: 7 % (ref 13–44)
MAGNESIUM SERPL-MCNC: 2.6 MG/DL (ref 1.8–2.4)
MCH RBC QN AUTO: 33.1 PG (ref 26.1–32.9)
MCHC RBC AUTO-ENTMCNC: 35.5 G/DL (ref 31.4–35)
MCV RBC AUTO: 93.2 FL (ref 79.6–97.8)
METHADONE UR QL: NEGATIVE
MONOCYTES # BLD: 1.5 K/UL (ref 0.1–1.3)
MONOCYTES NFR BLD: 8 % (ref 4–12)
NEUTS SEG # BLD: 15.1 K/UL (ref 1.7–8.2)
NEUTS SEG NFR BLD: 84 % (ref 43–78)
NITRITE UR QL STRIP.AUTO: NEGATIVE
NRBC # BLD: 0 K/UL (ref 0–0.2)
O2/TOTAL GAS SETTING VFR VENT: 21 %
OPIATES UR QL: NEGATIVE
PCO2 BLDV: 21 MMHG (ref 41–51)
PCP UR QL: NEGATIVE
PH BLDV: 7.45 [PH] (ref 7.32–7.42)
PH UR STRIP: 5.5 [PH] (ref 5–9)
PLATELET # BLD AUTO: 396 K/UL (ref 150–450)
PMV BLD AUTO: 8.9 FL (ref 9.4–12.3)
PO2 BLDV: 32 MMHG
POTASSIUM SERPL-SCNC: 4.4 MMOL/L (ref 3.5–5.1)
POTASSIUM SERPL-SCNC: 4.9 MMOL/L (ref 3.5–5.1)
PROT SERPL-MCNC: 8.6 G/DL (ref 6.3–8.2)
PROT UR STRIP-MCNC: 100 MG/DL
RBC # BLD AUTO: 5.47 M/UL (ref 4.23–5.6)
RBC #/AREA URNS HPF: ABNORMAL /HPF
SAO2 % BLDV: 67 % (ref 65–88)
SERVICE CMNT-IMP: ABNORMAL
SERVICE CMNT-IMP: ABNORMAL
SODIUM SERPL-SCNC: 136 MMOL/L (ref 136–145)
SODIUM SERPL-SCNC: 137 MMOL/L (ref 136–145)
SP GR UR REFRACTOMETRY: 1.03 (ref 1–1.02)
SPECIMEN TYPE: ABNORMAL
UROBILINOGEN UR QL STRIP.AUTO: 0.2 EU/DL (ref 0.2–1)
WBC # BLD AUTO: 18 K/UL (ref 4.3–11.1)
WBC URNS QL MICRO: ABNORMAL /HPF

## 2020-05-07 PROCEDURE — 99285 EMERGENCY DEPT VISIT HI MDM: CPT

## 2020-05-07 PROCEDURE — 80053 COMPREHEN METABOLIC PANEL: CPT

## 2020-05-07 PROCEDURE — 80307 DRUG TEST PRSMV CHEM ANLYZR: CPT

## 2020-05-07 PROCEDURE — 96376 TX/PRO/DX INJ SAME DRUG ADON: CPT

## 2020-05-07 PROCEDURE — 81001 URINALYSIS AUTO W/SCOPE: CPT

## 2020-05-07 PROCEDURE — 74011250636 HC RX REV CODE- 250/636: Performed by: EMERGENCY MEDICINE

## 2020-05-07 PROCEDURE — 96374 THER/PROPH/DIAG INJ IV PUSH: CPT

## 2020-05-07 PROCEDURE — 96361 HYDRATE IV INFUSION ADD-ON: CPT

## 2020-05-07 PROCEDURE — 83735 ASSAY OF MAGNESIUM: CPT

## 2020-05-07 PROCEDURE — 82803 BLOOD GASES ANY COMBINATION: CPT

## 2020-05-07 PROCEDURE — 96375 TX/PRO/DX INJ NEW DRUG ADDON: CPT

## 2020-05-07 PROCEDURE — 85025 COMPLETE CBC W/AUTO DIFF WBC: CPT

## 2020-05-07 PROCEDURE — 96372 THER/PROPH/DIAG INJ SC/IM: CPT

## 2020-05-07 PROCEDURE — 71046 X-RAY EXAM CHEST 2 VIEWS: CPT

## 2020-05-07 PROCEDURE — 65270000029 HC RM PRIVATE

## 2020-05-07 PROCEDURE — 74011000258 HC RX REV CODE- 258: Performed by: EMERGENCY MEDICINE

## 2020-05-07 RX ORDER — MIRTAZAPINE 45 MG/1
45 TABLET, FILM COATED ORAL
COMMUNITY

## 2020-05-07 RX ORDER — THIAMINE HYDROCHLORIDE 100 MG/ML
100 INJECTION, SOLUTION INTRAMUSCULAR; INTRAVENOUS
Status: DISCONTINUED | OUTPATIENT
Start: 2020-05-07 | End: 2020-05-07 | Stop reason: SDUPTHER

## 2020-05-07 RX ORDER — CHLORDIAZEPOXIDE HYDROCHLORIDE 5 MG/1
10 CAPSULE, GELATIN COATED ORAL
Status: DISCONTINUED | OUTPATIENT
Start: 2020-05-07 | End: 2020-05-08 | Stop reason: HOSPADM

## 2020-05-07 RX ORDER — GABAPENTIN 300 MG/1
300 CAPSULE ORAL 3 TIMES DAILY
COMMUNITY

## 2020-05-07 RX ORDER — DEXTROSE MONOHYDRATE AND SODIUM CHLORIDE 5; .9 G/100ML; G/100ML
125 INJECTION, SOLUTION INTRAVENOUS CONTINUOUS
Status: DISCONTINUED | OUTPATIENT
Start: 2020-05-07 | End: 2020-05-07 | Stop reason: HOSPADM

## 2020-05-07 RX ORDER — SODIUM CHLORIDE 0.9 % (FLUSH) 0.9 %
5-40 SYRINGE (ML) INJECTION EVERY 8 HOURS
Status: DISCONTINUED | OUTPATIENT
Start: 2020-05-08 | End: 2020-05-08 | Stop reason: HOSPADM

## 2020-05-07 RX ORDER — PAROXETINE HYDROCHLORIDE 20 MG/1
10 TABLET, FILM COATED ORAL DAILY
Status: DISCONTINUED | OUTPATIENT
Start: 2020-05-08 | End: 2020-05-08

## 2020-05-07 RX ORDER — LORAZEPAM 2 MG/ML
1 INJECTION INTRAMUSCULAR
Status: COMPLETED | OUTPATIENT
Start: 2020-05-07 | End: 2020-05-07

## 2020-05-07 RX ORDER — ONDANSETRON 2 MG/ML
4 INJECTION INTRAMUSCULAR; INTRAVENOUS
Status: COMPLETED | OUTPATIENT
Start: 2020-05-07 | End: 2020-05-07

## 2020-05-07 RX ORDER — SODIUM CHLORIDE 9 MG/ML
150 INJECTION, SOLUTION INTRAVENOUS CONTINUOUS
Status: DISCONTINUED | OUTPATIENT
Start: 2020-05-08 | End: 2020-05-08 | Stop reason: HOSPADM

## 2020-05-07 RX ORDER — PROMETHAZINE HYDROCHLORIDE 25 MG/ML
12.5 INJECTION, SOLUTION INTRAMUSCULAR; INTRAVENOUS
Status: COMPLETED | OUTPATIENT
Start: 2020-05-07 | End: 2020-05-07

## 2020-05-07 RX ORDER — LORAZEPAM 2 MG/ML
2 INJECTION INTRAMUSCULAR
Status: DISPENSED | OUTPATIENT
Start: 2020-05-07 | End: 2020-05-08

## 2020-05-07 RX ORDER — SODIUM CHLORIDE 9 MG/ML
1000 INJECTION, SOLUTION INTRAVENOUS ONCE
Status: COMPLETED | OUTPATIENT
Start: 2020-05-07 | End: 2020-05-07

## 2020-05-07 RX ORDER — TRAZODONE HYDROCHLORIDE 50 MG/1
150 TABLET ORAL
Status: DISCONTINUED | OUTPATIENT
Start: 2020-05-08 | End: 2020-05-08 | Stop reason: HOSPADM

## 2020-05-07 RX ORDER — SODIUM CHLORIDE 0.9 % (FLUSH) 0.9 %
5-40 SYRINGE (ML) INJECTION AS NEEDED
Status: DISCONTINUED | OUTPATIENT
Start: 2020-05-07 | End: 2020-05-08 | Stop reason: HOSPADM

## 2020-05-07 RX ORDER — LORAZEPAM 1 MG/1
1 TABLET ORAL
Status: DISCONTINUED | OUTPATIENT
Start: 2020-05-07 | End: 2020-05-08 | Stop reason: HOSPADM

## 2020-05-07 RX ORDER — QUETIAPINE FUMARATE 50 MG/1
50 TABLET, FILM COATED ORAL 3 TIMES DAILY
COMMUNITY

## 2020-05-07 RX ORDER — FAMOTIDINE 10 MG/ML
20 INJECTION INTRAVENOUS
Status: COMPLETED | OUTPATIENT
Start: 2020-05-07 | End: 2020-05-07

## 2020-05-07 RX ORDER — LORAZEPAM 1 MG/1
1 TABLET ORAL
Status: DISCONTINUED | OUTPATIENT
Start: 2020-05-07 | End: 2020-05-08 | Stop reason: SDUPTHER

## 2020-05-07 RX ADMIN — PROMETHAZINE HYDROCHLORIDE 12.5 MG: 25 INJECTION INTRAMUSCULAR; INTRAVENOUS at 16:53

## 2020-05-07 RX ADMIN — LORAZEPAM 1 MG: 2 INJECTION INTRAMUSCULAR; INTRAVENOUS at 20:54

## 2020-05-07 RX ADMIN — LORAZEPAM 1 MG: 2 INJECTION INTRAMUSCULAR; INTRAVENOUS at 16:22

## 2020-05-07 RX ADMIN — FAMOTIDINE 20 MG: 10 INJECTION, SOLUTION INTRAVENOUS at 20:54

## 2020-05-07 RX ADMIN — DEXTROSE MONOHYDRATE AND SODIUM CHLORIDE 125 ML/HR: 5; .9 INJECTION, SOLUTION INTRAVENOUS at 18:38

## 2020-05-07 RX ADMIN — THIAMINE HYDROCHLORIDE 100 MG: 100 INJECTION, SOLUTION INTRAMUSCULAR; INTRAVENOUS at 16:54

## 2020-05-07 RX ADMIN — SODIUM CHLORIDE 1000 ML: 900 INJECTION, SOLUTION INTRAVENOUS at 17:54

## 2020-05-07 RX ADMIN — LORAZEPAM 1 MG: 2 INJECTION INTRAMUSCULAR; INTRAVENOUS at 18:18

## 2020-05-07 RX ADMIN — ONDANSETRON 4 MG: 2 INJECTION INTRAMUSCULAR; INTRAVENOUS at 20:54

## 2020-05-07 RX ADMIN — ONDANSETRON 4 MG: 2 INJECTION INTRAMUSCULAR; INTRAVENOUS at 18:18

## 2020-05-07 RX ADMIN — SODIUM CHLORIDE 1000 ML: 900 INJECTION, SOLUTION INTRAVENOUS at 16:21

## 2020-05-07 NOTE — ED PROVIDER NOTES
55-year-old white male with history of alcohol dependence presents with plaints of withdrawal since this morning. He was unable to keep down more than 2 glasses of wine due to nausea and vomiting and has become very shaky and ill feeling. No hallucinations or seizure. Patient was released from [facility] after detox last Thursday and started drinking again this past Monday. He has been drinking 2-4 bottles of wine a day up until this morning when he was unable to keep down the wine due to nausea and vomiting. He last ate yesterday. He states his girlfriend is in [facility] and he would like to go back there. The history is provided by the patient. Withdrawal   Primary symptoms include: agitation. There areno seizures, no weakness and no hallucinations present at this time. Primary symptoms comment: Nervous, shaking with nausea and vomiting since this morning. This is a new problem. The current episode started 6 to 12 hours ago. The problem has been gradually worsening. Suspected agents include alcohol. Associated symptoms include nausea and vomiting. Pertinent negatives include no fever. Associated medical issues include withdrawal syndrome. Past Medical History:   Diagnosis Date    Ill-defined condition     ETOH    Psychiatric disorder     anxiety/depression       History reviewed. No pertinent surgical history. History reviewed. No pertinent family history.     Social History     Socioeconomic History    Marital status:      Spouse name: Not on file    Number of children: Not on file    Years of education: Not on file    Highest education level: Not on file   Occupational History    Not on file   Social Needs    Financial resource strain: Not on file    Food insecurity     Worry: Not on file     Inability: Not on file    Transportation needs     Medical: Not on file     Non-medical: Not on file   Tobacco Use    Smoking status: Former Smoker     Last attempt to quit: 1/2/2006 Years since quittin.3    Smokeless tobacco: Never Used   Substance and Sexual Activity    Alcohol use: Yes     Alcohol/week: 34.2 standard drinks     Types: 14 Glasses of wine, 6 Cans of beer, 21 Standard drinks or equivalent per week     Comment: daily 2-3     Drug use: No    Sexual activity: Not on file   Lifestyle    Physical activity     Days per week: Not on file     Minutes per session: Not on file    Stress: Not on file   Relationships    Social connections     Talks on phone: Not on file     Gets together: Not on file     Attends [employer]'s witness service: Not on file     Active member of club or organization: Not on file     Attends meetings of clubs or organizations: Not on file     Relationship status: Not on file    Intimate partner violence     Fear of current or ex partner: Not on file     Emotionally abused: Not on file     Physically abused: Not on file     Forced sexual activity: Not on file   Other Topics Concern    Not on file   Social History Narrative    Not on file         ALLERGIES: Patient has no known allergies. Review of Systems   Constitutional: Positive for chills. Negative for fever. HENT: Negative for congestion, rhinorrhea and sore throat. Eyes: Negative for photophobia and redness. Respiratory: Negative for cough and shortness of breath. Cardiovascular: Negative for chest pain and leg swelling. Gastrointestinal: Positive for nausea and vomiting. Negative for abdominal pain and diarrhea. Endocrine: Negative for polyuria. Genitourinary: Negative for dysuria. Musculoskeletal: Negative for back pain and myalgias. Neurological: Negative for seizures, weakness, numbness and headaches. Psychiatric/Behavioral: Positive for agitation. Negative for hallucinations.        Vitals:    20 1552   BP: 140/89   Pulse: (!) 130   Resp: 20   Temp: 98.5 °F (36.9 °C)   SpO2: 96%   Weight: 63.5 kg (140 lb)   Height: 6' (1.829 m)            Physical Exam  Vitals signs and nursing note reviewed. Constitutional:       Appearance: He is well-developed. HENT:      Mouth/Throat:      Mouth: Mucous membranes are moist.      Pharynx: No oropharyngeal exudate. Eyes:      Conjunctiva/sclera: Conjunctivae normal.      Pupils: Pupils are equal, round, and reactive to light. Neck:      Musculoskeletal: Neck supple. Cardiovascular:      Rate and Rhythm: Regular rhythm. Tachycardia present. Heart sounds: Normal heart sounds. Pulmonary:      Effort: Pulmonary effort is normal.      Breath sounds: Normal breath sounds. Abdominal:      General: Bowel sounds are normal. There is no distension. Palpations: Abdomen is soft. Tenderness: There is no abdominal tenderness. There is no guarding or rebound. Musculoskeletal: Normal range of motion. General: No tenderness. Lymphadenopathy:      Cervical: No cervical adenopathy. Skin:     General: Skin is warm and dry. Neurological:      Mental Status: He is alert and oriented to person, place, and time. MDM  Number of Diagnoses or Management Options  Diagnosis management comments: Hydrate and treat withdrawal with Ativan. Thiamine ordered as well. Check blood work and electrolytes for complications and monitor for DTs.        Amount and/or Complexity of Data Reviewed  Clinical lab tests: ordered and reviewed           Procedures

## 2020-05-07 NOTE — ED TRIAGE NOTES
Patient states he recently got out of rehab for alcohol abuse, pt states he has been under increased stress and began to drink a few days. Patient admits to 4 bottles a win per day.

## 2020-05-08 VITALS
OXYGEN SATURATION: 99 % | SYSTOLIC BLOOD PRESSURE: 125 MMHG | DIASTOLIC BLOOD PRESSURE: 80 MMHG | RESPIRATION RATE: 18 BRPM | HEART RATE: 83 BPM | TEMPERATURE: 98 F

## 2020-05-08 PROBLEM — F41.9 ANXIETY: Status: ACTIVE | Noted: 2020-05-08

## 2020-05-08 PROBLEM — F32.A DEPRESSION: Status: ACTIVE | Noted: 2020-05-08

## 2020-05-08 LAB
ANION GAP SERPL CALC-SCNC: 7 MMOL/L (ref 7–16)
BASOPHILS # BLD: 0.1 K/UL (ref 0–0.2)
BASOPHILS NFR BLD: 1 % (ref 0–2)
BUN SERPL-MCNC: 17 MG/DL (ref 6–23)
CALCIUM SERPL-MCNC: 7.4 MG/DL (ref 8.3–10.4)
CHLORIDE SERPL-SCNC: 105 MMOL/L (ref 98–107)
CO2 SERPL-SCNC: 26 MMOL/L (ref 21–32)
CREAT SERPL-MCNC: 0.63 MG/DL (ref 0.8–1.5)
DIFFERENTIAL METHOD BLD: ABNORMAL
EOSINOPHIL # BLD: 0.1 K/UL (ref 0–0.8)
EOSINOPHIL NFR BLD: 1 % (ref 0.5–7.8)
ERYTHROCYTE [DISTWIDTH] IN BLOOD BY AUTOMATED COUNT: 13.7 % (ref 11.9–14.6)
GLUCOSE SERPL-MCNC: 94 MG/DL (ref 65–100)
HCT VFR BLD AUTO: 37.5 % (ref 41.1–50.3)
HGB BLD-MCNC: 13.3 G/DL (ref 13.6–17.2)
IMM GRANULOCYTES # BLD AUTO: 0 K/UL (ref 0–0.5)
IMM GRANULOCYTES NFR BLD AUTO: 0 % (ref 0–5)
LYMPHOCYTES # BLD: 1.6 K/UL (ref 0.5–4.6)
LYMPHOCYTES NFR BLD: 20 % (ref 13–44)
MCH RBC QN AUTO: 32.8 PG (ref 26.1–32.9)
MCHC RBC AUTO-ENTMCNC: 35.5 G/DL (ref 31.4–35)
MCV RBC AUTO: 92.4 FL (ref 79.6–97.8)
MONOCYTES # BLD: 1.4 K/UL (ref 0.1–1.3)
MONOCYTES NFR BLD: 17 % (ref 4–12)
NEUTS SEG # BLD: 4.9 K/UL (ref 1.7–8.2)
NEUTS SEG NFR BLD: 61 % (ref 43–78)
NRBC # BLD: 0 K/UL (ref 0–0.2)
PLATELET # BLD AUTO: 240 K/UL (ref 150–450)
PMV BLD AUTO: 9 FL (ref 9.4–12.3)
POTASSIUM SERPL-SCNC: 3.5 MMOL/L (ref 3.5–5.1)
RBC # BLD AUTO: 4.06 M/UL (ref 4.23–5.6)
SODIUM SERPL-SCNC: 138 MMOL/L (ref 136–145)
WBC # BLD AUTO: 8 K/UL (ref 4.3–11.1)

## 2020-05-08 PROCEDURE — 80048 BASIC METABOLIC PNL TOTAL CA: CPT

## 2020-05-08 PROCEDURE — 74011250637 HC RX REV CODE- 250/637: Performed by: FAMILY MEDICINE

## 2020-05-08 PROCEDURE — 74011250636 HC RX REV CODE- 250/636: Performed by: FAMILY MEDICINE

## 2020-05-08 PROCEDURE — 74011000250 HC RX REV CODE- 250: Performed by: FAMILY MEDICINE

## 2020-05-08 PROCEDURE — 36415 COLL VENOUS BLD VENIPUNCTURE: CPT

## 2020-05-08 PROCEDURE — 74011250637 HC RX REV CODE- 250/637: Performed by: INTERNAL MEDICINE

## 2020-05-08 PROCEDURE — 99218 HC RM OBSERVATION: CPT

## 2020-05-08 PROCEDURE — 96376 TX/PRO/DX INJ SAME DRUG ADON: CPT

## 2020-05-08 PROCEDURE — 85025 COMPLETE CBC W/AUTO DIFF WBC: CPT

## 2020-05-08 RX ORDER — GABAPENTIN 300 MG/1
300 CAPSULE ORAL 3 TIMES DAILY
Status: DISCONTINUED | OUTPATIENT
Start: 2020-05-08 | End: 2020-05-08 | Stop reason: HOSPADM

## 2020-05-08 RX ORDER — FLUOXETINE HYDROCHLORIDE 20 MG/1
20 CAPSULE ORAL DAILY
Status: DISCONTINUED | OUTPATIENT
Start: 2020-05-08 | End: 2020-05-08 | Stop reason: HOSPADM

## 2020-05-08 RX ORDER — PHENYTOIN SODIUM 100 MG/1
200 CAPSULE, EXTENDED RELEASE ORAL 2 TIMES DAILY
Status: ON HOLD | COMMUNITY
End: 2021-01-11

## 2020-05-08 RX ORDER — DIVALPROEX SODIUM 250 MG/1
250 TABLET, DELAYED RELEASE ORAL 3 TIMES DAILY
Status: DISCONTINUED | OUTPATIENT
Start: 2020-05-08 | End: 2020-05-08 | Stop reason: HOSPADM

## 2020-05-08 RX ORDER — FLUOXETINE 20 MG/1
20 TABLET ORAL DAILY
Status: ON HOLD | COMMUNITY
End: 2021-01-11

## 2020-05-08 RX ORDER — MIRTAZAPINE 15 MG/1
45 TABLET, FILM COATED ORAL
Status: DISCONTINUED | OUTPATIENT
Start: 2020-05-08 | End: 2020-05-08 | Stop reason: HOSPADM

## 2020-05-08 RX ORDER — NALTREXONE HYDROCHLORIDE 50 MG/1
50 TABLET, FILM COATED ORAL DAILY
Status: ON HOLD | COMMUNITY
End: 2021-01-11

## 2020-05-08 RX ORDER — PHENYTOIN SODIUM 100 MG/1
200 CAPSULE, EXTENDED RELEASE ORAL 2 TIMES DAILY
Status: DISCONTINUED | OUTPATIENT
Start: 2020-05-08 | End: 2020-05-08 | Stop reason: HOSPADM

## 2020-05-08 RX ORDER — DIVALPROEX SODIUM 250 MG/1
TABLET, DELAYED RELEASE ORAL 3 TIMES DAILY
Status: ON HOLD | COMMUNITY
End: 2021-01-11

## 2020-05-08 RX ORDER — QUETIAPINE FUMARATE 25 MG/1
50 TABLET, FILM COATED ORAL 3 TIMES DAILY
Status: DISCONTINUED | OUTPATIENT
Start: 2020-05-08 | End: 2020-05-08 | Stop reason: HOSPADM

## 2020-05-08 RX ADMIN — FLUOXETINE 20 MG: 20 CAPSULE ORAL at 11:27

## 2020-05-08 RX ADMIN — LORAZEPAM 1 MG: 1 TABLET ORAL at 11:27

## 2020-05-08 RX ADMIN — Medication 10 ML: at 13:23

## 2020-05-08 RX ADMIN — FOLIC ACID: 5 INJECTION, SOLUTION INTRAMUSCULAR; INTRAVENOUS; SUBCUTANEOUS at 01:00

## 2020-05-08 RX ADMIN — LORAZEPAM 2 MG: 2 INJECTION INTRAMUSCULAR; INTRAVENOUS at 01:01

## 2020-05-08 RX ADMIN — Medication 10 ML: at 05:08

## 2020-05-08 RX ADMIN — CHLORDIAZEPOXIDE HYDROCHLORIDE 10 MG: 5 CAPSULE ORAL at 09:02

## 2020-05-08 RX ADMIN — LORAZEPAM 1 MG: 1 TABLET ORAL at 09:02

## 2020-05-08 RX ADMIN — SODIUM CHLORIDE 150 ML/HR: 9 INJECTION, SOLUTION INTRAVENOUS at 01:05

## 2020-05-08 RX ADMIN — TRAZODONE HYDROCHLORIDE 150 MG: 50 TABLET ORAL at 01:00

## 2020-05-08 RX ADMIN — Medication 10 ML: at 01:01

## 2020-05-08 RX ADMIN — QUETIAPINE FUMARATE 50 MG: 25 TABLET ORAL at 13:22

## 2020-05-08 NOTE — H&P
HOSPITALIST INITIAL HISTORY AND PHYSICAL    NAME:  Maria Luz Koch   Age:  44 y.o.  :   1980   MRN:   [id number]  PCP: Orlene Hatchet, MD  Consulting MD:  Treatment Team: Attending Provider: Eh Byrne MD    CHIEF COMPLAINT: alcohol withdrawal     HISTORY OF PRESENT ILLNESS:   Maria Luz Koch is a 44 y.o. male with a past medical history of alcohol dependence who presents to the ER with report of withdrawal symptoms such as shakiness and nausea. He was recently in [facility] for detox, discharged last Thursday and started drinking 2-4 bottles of wine every day starting Monday. He reports not drinking at all throughout the day today but began having full body shakes, nausea, and vomiting. He denies any hallucinations or seizure activity. Wellington Edouard REVIEW OF SYSTEMS: Comprehensive ROS performed. Denies fevers, chills, admits to nausea and vomiting, otherwise negative. Past Medical History:   Diagnosis Date    Ill-defined condition     ETOH    Psychiatric disorder     anxiety/depression        No past surgical history on file. Cannot display prior to admission medications because the patient has not been admitted in this contact. No Known Allergies    FAMILY HISTORY: Reviewed. Negative except No family history on file. Social History     Tobacco Use    Smoking status: Former Smoker     Last attempt to quit: 2006     Years since quittin.3    Smokeless tobacco: Never Used   Substance Use Topics    Alcohol use: Yes     Alcohol/week: 34.2 standard drinks     Types: 14 Glasses of wine, 6 Cans of beer, 21 Standard drinks or equivalent per week     Comment: daily 2-3          Objective: There were no vitals taken for this visit. Temp (24hrs), Av.5 °F (36.9 °C), Min:98.5 °F (36.9 °C), Max:98.5 °F (36.9 °C)       Physical Exam:  General:    The patient is a middle aged male in no acute distress. Head:   Normocephalic/atraumatic. Eyes:  No palpebral pallor or scleral icterus. ENT:  External auricular and nasal exam within normal limits. Lungs:   No respiratory distress or accessory muscle use  Abdomen:   non-distended with normoactive bowel sounds. Skin:     Normal color, texture, and turgor. No rashes, lesions, or jaundice. Neurologic: CN II-XII grossly intact and symmetrical.  Psychiatric:  appropriate affect. Data Review:   Recent Results (from the past 24 hour(s))   CBC WITH AUTOMATED DIFF    Collection Time: 05/07/20  4:10 PM   Result Value Ref Range    WBC 18.0 (H) 4.3 - 11.1 K/uL    RBC 5.47 4.23 - 5.6 M/uL    HGB 18.1 (H) 13.6 - 17.2 g/dL    HCT 51.0 (H) 41.1 - 50.3 %    MCV 93.2 79.6 - 97.8 FL    MCH 33.1 (H) 26.1 - 32.9 PG    MCHC 35.5 (H) 31.4 - 35.0 g/dL    RDW 13.9 11.9 - 14.6 %    PLATELET 314 487 - 199 K/uL    MPV 8.9 (L) 9.4 - 12.3 FL    ABSOLUTE NRBC 0.00 0.0 - 0.2 K/uL    DF AUTOMATED      NEUTROPHILS 84 (H) 43 - 78 %    LYMPHOCYTES 7 (L) 13 - 44 %    MONOCYTES 8 4.0 - 12.0 %    EOSINOPHILS 0 (L) 0.5 - 7.8 %    BASOPHILS 1 0.0 - 2.0 %    IMMATURE GRANULOCYTES 1 0.0 - 5.0 %    ABS. NEUTROPHILS 15.1 (H) 1.7 - 8.2 K/UL    ABS. LYMPHOCYTES 1.3 0.5 - 4.6 K/UL    ABS. MONOCYTES 1.5 (H) 0.1 - 1.3 K/UL    ABS. EOSINOPHILS 0.0 0.0 - 0.8 K/UL    ABS. BASOPHILS 0.1 0.0 - 0.2 K/UL    ABS. IMM. GRANS. 0.1 0.0 - 0.5 K/UL   METABOLIC PANEL, COMPREHENSIVE    Collection Time: 05/07/20  4:10 PM   Result Value Ref Range    Sodium 137 136 - 145 mmol/L    Potassium 4.9 3.5 - 5.1 mmol/L    Chloride 98 98 - 107 mmol/L    CO2 14 (L) 21 - 32 mmol/L    Anion gap 25 (H) 7 - 16 mmol/L    Glucose 166 (H) 65 - 100 mg/dL    BUN 16 6 - 23 MG/DL    Creatinine 1.09 0.8 - 1.5 MG/DL    GFR est AA >60 >60 ml/min/1.73m2    GFR est non-AA >60 >60 ml/min/1.73m2    Calcium 8.7 8.3 - 10.4 MG/DL    Bilirubin, total 0.4 0.2 - 1.1 MG/DL    ALT (SGPT) 38 12 - 65 U/L    AST (SGOT) 50 (H) 15 - 37 U/L    Alk.  phosphatase 69 50 - 136 U/L    Protein, total 8.6 (H) 6.3 - 8.2 g/dL    Albumin 4.4 3.5 - 5.0 g/dL Globulin 4.2 (H) 2.3 - 3.5 g/dL    A-G Ratio 1.0 (L) 1.2 - 3.5     MAGNESIUM    Collection Time: 05/07/20  4:10 PM   Result Value Ref Range    Magnesium 2.6 (H) 1.8 - 2.4 mg/dL   ETHYL ALCOHOL    Collection Time: 05/07/20  4:10 PM   Result Value Ref Range    ALCOHOL(ETHYL),SERUM 184 MG/DL   POC VENOUS BLOOD GAS    Collection Time: 05/07/20  5:25 PM   Result Value Ref Range    Device: ROOM AIR      FIO2 (POC) 21 %    pH, venous (POC) 7.448 (H) 7.32 - 7.42      pCO2, venous (POC) 21.0 (L) 41 - 51 MMHG    pO2, venous (POC) 32 mmHg    HCO3, venous (POC) 14.5 (L) 23 - 28 MMOL/L    sO2, venous (POC) 67 65 - 88 %    Base deficit, venous (POC) 7 mmol/L    Allens test (POC) NOT APPLICABLE      Site OTHER      Specimen type (POC) VENOUS BLOOD      Performed by Antonella     CO2, POC 15 MMOL/L    Critical value read back 60:86    METABOLIC PANEL, BASIC    Collection Time: 05/07/20  8:18 PM   Result Value Ref Range    Sodium 136 136 - 145 mmol/L    Potassium 4.4 3.5 - 5.1 mmol/L    Chloride 101 98 - 107 mmol/L    CO2 12 (L) 21 - 32 mmol/L    Anion gap 23 (H) 7 - 16 mmol/L    Glucose 164 (H) 65 - 100 mg/dL    BUN 15 6 - 23 MG/DL    Creatinine 0.82 0.8 - 1.5 MG/DL    GFR est AA >60 >60 ml/min/1.73m2    GFR est non-AA >60 >60 ml/min/1.73m2    Calcium 8.7 8.3 - 10.4 MG/DL       Imaging /Procedures /Studies:  Xr Chest Pa Lat    Result Date: 5/7/2020  IMPRESSION:  NO ACUTE CARDIOPULMONARY DISEASE IDENTIFIED. Assessment and Plan: Active Problems:    Alcohol withdrawal ([facility] [address] (6/19/2019)    [facility] protocol. Librium 10 TID. Follow clinically. Leukocytosis (5/7/2020)    Follow CBC        DVT Prophylaxis: Lovenox      Code Status: FULL CODE      Disposition: Admit to med/surg at [facility] for evaluation and treatment as per above.       Anticipated discharge: 2-3 days     Signed By: Nikki Díaz MD     May 7, 2020

## 2020-05-08 NOTE — PROGRESS NOTES
TRANSFER - IN REPORT:    Verbal report received from [place], [facility] on [address]  being received from [facility] ED for routine progression of care      Report consisted of patients Situation, Background, Assessment and   Recommendations(SBAR). Information from the following report(s) SBAR, ED Summary, Intake/Output and Recent Results was reviewed with the receiving nurse. Opportunity for questions and clarification was provided. Assessment will be completed upon patients arrival to unit and care assumed.

## 2020-05-08 NOTE — ROUTINE PROCESS
Report called to [facility] on 2nd Floor at [address] #  jelcos x 2 via the left hand and left AC without swelling or redness. Transported to [facility] via [employer] .

## 2020-05-08 NOTE — PROGRESS NOTES
Spiritual Care Visit, initial visit. Visited with patient at bedside. Unfortunate case of a young man who reportedly abused alcohol. He is being voluntarily admitted to [facility] for treatment for substance abuse. He was discharge a short time ago. He requested that  pray when he () gets home. Will do. Visit by Roxi Barajas, Staff .  M.Ed., Th.B., B.A.

## 2020-05-08 NOTE — PROGRESS NOTES
END OF SHIFT NOTE:    INTAKE/OUTPUT  05/07 0701 - 05/08 0700  In: 1130 [I.V.:1130]  Out: 500 [Urine:500]  Voiding: YES  Catheter: NO  Drain:              Flatus: Patient does have flatus present. Stool:  1 occurrences. Characteristics:(Patient flushed and nurse did not observe)       Emesis: 0 occurrences. Characteristics:        VITAL SIGNS  Patient Vitals for the past 12 hrs:   Temp Pulse Resp BP SpO2   05/08/20 0312 99.1 °F (37.3 °C) (!) 106 17 114/78 99 %   05/07/20 2346 98.9 °F (37.2 °C) (!) 108 16 152/89 100 %       Pain Assessment  Pain Intensity 1: 5 (05/07/20 2345)     Pain Intervention(s) 1: Emotional support  Patient Stated Pain Goal: 0    Ambulating  Yes    Shift report given to oncoming nurse at the bedside.     [address]

## 2020-05-08 NOTE — PROGRESS NOTES
Unable to complete prior to medication list because patient states he takes Dilantin but does not know the dosage.

## 2020-05-08 NOTE — DISCHARGE SUMMARY
Hospitalist Discharge Summary     Patient ID:  Maria Luz Koch  [id number]  67 y.o.  1980  Admit date: 5/7/2020 11:42 PM  Discharge date and time: 5/8/2020  Attending: Yuliya Quevedo DO  PCP:  Gaston Cabrera MD  Treatment Team: Attending Provider: Yuliya Quevedo DO; Care Manager: Nichol Manuel RN; Utilization Review: Adela Serrano RN    Principal Diagnosis Alcohol withdrawal [facility])    Hospital Problems as of 5/8/2020 Never Reviewed          Codes Class Noted - Resolved POA    Anxiety ICD-10-CM: F41.9  ICD-9-CM: 300.00  5/8/2020 - Present Yes        Depression ICD-10-CM: F32.9  ICD-9-CM: 996  5/8/2020 - Present Yes        Leukocytosis ICD-10-CM: D72.829  ICD-9-CM: 288.60  5/7/2020 - Present Yes        * (Principal) Alcohol withdrawal ([facility] [address] ICD-10-CM: H71.037  ICD-9-CM: 291.81  6/19/2019 - Present Yes              Hospital Course:  44 y.o. male with a PMH of alcohol dependence who presents to the ER with report of withdrawal symptoms such as shakiness and nausea. He was recently in [facility] for detox, discharged last Thursday and started drinking 2-4 bottles of wine every day starting Monday. He reports not drinking at all throughout the day today but began having full body shakes, nausea, and vomiting. He denies any hallucinations or seizure activity. He was admitted and given 2 doses of Librium and fluids. The next morning he felt much improved and requested to be discharged to [facility] for further help. He remained stable and was discharged to [facility].     Significant Diagnostic Studies:    Labs: Results:       Chemistry Recent Labs     05/08/20  0427 05/07/20 2018 05/07/20  1610   GLU 94 164* 166*    136 137   K 3.5 4.4 4.9    101 98   CO2 26 12* 14*   BUN 17 15 16   CREA 0.63* 0.82 1.09   CA 7.4* 8.7 8.7   AGAP 7 23* 25*   AP  --   --  69   TP  --   --  8.6*   ALB  --   --  4.4   GLOB  --   --  4.2*   AGRAT  --   --  1.0*      CBC w/Diff Recent Labs     05/08/20  0427 05/07/20  5830 WBC 8.0 18.0*   RBC 4.06* 5.47   HGB 13.3* 18.1*   HCT 37.5* 51.0*    396   GRANS 61 84*   LYMPH 20 7*   EOS 1 0*      Cardiac Enzymes No results for input(s): CPK, CKND1, HERNAN in the last 72 hours. No lab exists for component: CKRMB, TROIP   Coagulation No results for input(s): PTP, INR, APTT, INREXT, INREXT in the last 72 hours. Lipid Panel No results found for: CHOL, CHOLPOCT, CHOLX, CHLST, CHOLV, 755648, HDL, HDLP, LDL, LDLC, DLDLP, 836990, VLDLC, VLDL, TGLX, TRIGL, TRIGP, TGLPOCT, CHHD, CHHDX   BNP No results for input(s): BNPP in the last 72 hours. Liver Enzymes Recent Labs     05/07/20  1610   TP 8.6*   ALB 4.4   AP 69   SGOT 50*      Thyroid Studies No results found for: T4, T3U, TSH, TSHEXT, TSHEXT         Imaging:  Xr Chest Pa Lat    Result Date: 5/7/2020  IMPRESSION:  NO ACUTE CARDIOPULMONARY DISEASE IDENTIFIED. Microbiology/Cultures: All Micro Results     None          Discharge Exam:  Visit Vitals  /75   Pulse 89   Temp 98.5 °F (36.9 °C)   Resp 18   SpO2 100%     General appearance: alert, cooperative, no distress, appears stated age  Lungs: clear to auscultation bilaterally  Heart: regular rate and rhythm, S1, S2 normal, no murmur, click, rub or gallop  Abdomen: soft, non-tender. Bowel sounds normal. No masses,  no organomegaly  Extremities: no cyanosis or edema  Neurologic: Grossly normal    Disposition: [address] [place] [address]  Discharge Condition: stable  Patient Instructions:   Current Discharge Medication List      CONTINUE these medications which have NOT CHANGED    Details   FLUoxetine (PROzac) 20 mg tablet Take 20 mg by mouth daily. naltrexone (DEPADE) 50 mg tablet Take 50 mg by mouth daily. divalproex DR (DEPAKOTE) 250 mg tablet Take  by mouth three (3) times daily. phenytoin ER (DILANTIN ER) 100 mg ER capsule Take 200 mg by mouth two (2) times a day. QUEtiapine (SEROqueL) 50 mg tablet Take 50 mg by mouth three (3) times daily.  Indications: repeated episodes of anxiety      mirtazapine (Remeron) 45 mg tablet Take 45 mg by mouth nightly.      gabapentin (NEURONTIN) 300 mg capsule Take 300 mg by mouth three (3) times daily. Indications: alcoholism      traZODone (DESYREL) 150 mg tablet Take 150 mg by mouth nightly.  Indications: PT. NEEDS NEW PRESCRIPTION AT D/C.      chlordiazePOXIDE (LIBRIUM) 25 mg capsule Take 2 tablets TID for 1 day  Then take 1 tablet TID on day 2  Then take 1 tablet  BID on day 3  Then take 1 tablet qhs on day 4  Qty: 12 Cap, Refills: 0    Associated Diagnoses: Alcohol withdrawal syndrome with complication (HCC)             Activity: Activity as tolerated  Diet: Regular Diet  Wound Care: None needed    Follow-up  ·   PCP once discharged from [facility]  Time spent to discharge patient 35 minutes  Signed:  Deena Phelan DO  5/8/2020  2:31 PM

## 2020-05-08 NOTE — PROGRESS NOTES
05/07/20 2899   Dual Skin Pressure Injury Assessment   Dual Skin Pressure Injury Assessment WDL   Second Care Provider (Based on [address] [place] [address]) Lana Virk RN    Skin Integumentary   Skin Integumentary (WDL) X    Pressure  Injury Documentation No Pressure Injury Noted-Pressure Ulcer Prevention Initiated   Skin Color Red;Flushed   Skin Condition/Temp Diaphoretic; Warm   Skin Integrity Abrasion; Intact;Scars (comment); Tattoos (comment)   Turgor Non-tenting   Wound Prevention and Protection Methods   Orientation of Wound Prevention Posterior   Location of Wound Prevention Sacrum/Coccyx   Dressing Present  No   Wound Offloading (Prevention Methods) Bed, pressure reduction mattress

## 2020-05-08 NOTE — PROGRESS NOTES
Care Management Interventions  PCP Verified by CM: Yes(Pt does not have PCP)  Mode of Transport at Discharge: Other (see comment)(taxi transportation)  Transition of Care Consult (CM Consult): Discharge Planning  Discharge Durable Medical Equipment: No  Physical Therapy Consult: No  Occupational Therapy Consult: No  Speech Therapy Consult: No  Current Support Network: Own Home, Other(Pt lives at home with a friend)  Confirm Follow Up Transport: Cab  The Plan for Transition of Care is Related to the Following Treatment Goals : pt requests voluntary commitment to [facility]  The Patient and/or Patient Representative was Provided with a Choice of Provider and Agrees with the Discharge Plan?: Yes  Name of the Patient Representative Who was Provided with a Choice of Provider and Agrees with the Discharge Plan: Mr. Beyer Homes of Choice List was Provided with Basic Dialogue that Supports the Patient's Individualized Plan of Care/Goals, Treatment Preferences and Shares the Quality Data Associated with the Providers?: (N/A)  [place] Resource Information Provided?: No  Discharge Location  Discharge Placement: Inpatient susbstance abuse program        This CM met with pt at bedside this day to complete assessment. Pt verified his insurance, emergency contact, and home address. He reports no difficulty obtaining his medications in the community. He reports he lives at home with his friend with steps to enter and no home DME. He confirms that at baseline prior to admission he is independent with his ADLs including bathing, dressing, cooking, and driving. Pt requests voluntary admission to [facility]. Pt recently discharged last week from [facility] following his admission there for reported addiction and depression. He reports that unfortunately after he returned home he began drinking again. He reports to nursing staff he is drinking 2-3 bottles of wine per day.   Pt currently denies any suicidal or homicidal ideations. This CM spoke to intake at [facility] and they confirm pt called yesterday requesting readmission to the facility. Intake reports that referral needs to be sent and if approved by MD then they have availability for admission today. Pt will need transportation if approved to admit to [facility] this day. This CM sent referral this day. Will await [facility] response.

## 2020-05-08 NOTE — PROGRESS NOTES
Mr. Joan Prieto of room 216 accepted to [facility] ([phone]) in [facility] for voluntary inpatient psychiatric treatment of ETOH abuse. He will transfer via [address] [place] [address] ([phone]). ANGIE paperwork sent with patient, copy to his paper chart. Mr. Joan Prieto is in agreement with this plan. Care Management Interventions  PCP Verified by CM: Yes(Pt does not have PCP)  Mode of Transport at Discharge:  Other (see comment)(taxi transportation)  Transition of Care Consult (CM Consult): Discharge Planning  Discharge Durable Medical Equipment: No  Physical Therapy Consult: No  Occupational Therapy Consult: No  Speech Therapy Consult: No  Current Support Network: Own Home, Other(Pt lives at home with a friend)  Confirm Follow Up Transport: Cab  The Plan for Transition of Care is Related to the Following Treatment Goals : pt requests voluntary commitment to [facility]  The Patient and/or Patient Representative was Provided with a Choice of Provider and Agrees with the Discharge Plan?: Yes  Name of the Patient Representative Who was Provided with a Choice of Provider and Agrees with the Discharge Plan: Mr. Beltran [place] of Choice List was Provided with Basic Dialogue that Supports the Patient's Individualized Plan of Care/Goals, Treatment Preferences and Shares the Quality Data Associated with the Providers?: (N/A)  [place] Resource Information Provided?: No  Discharge Location  Discharge Placement: Inpatient susbstance abuse program([facility] Voluntary)

## 2020-05-08 NOTE — ED NOTES
(LENA) pt vomited a large amt of dark coffee ground material after drinking ginger ale. Continues to request something to drink.   Reminded the pt that he just received Zofran and it needs to work before he gets something to drink

## 2021-01-08 ENCOUNTER — HOSPITAL ENCOUNTER (EMERGENCY)
Age: 41
Discharge: HOME | DRG: 896 | End: 2021-01-08
Attending: EMERGENCY MEDICINE
Payer: COMMERCIAL

## 2021-01-08 ENCOUNTER — APPOINTMENT (OUTPATIENT)
Dept: GENERAL RADIOLOGY | Age: 41
DRG: 896 | End: 2021-01-08
Attending: PHYSICIAN ASSISTANT
Payer: COMMERCIAL

## 2021-01-08 VITALS
RESPIRATION RATE: 18 BRPM | SYSTOLIC BLOOD PRESSURE: 152 MMHG | TEMPERATURE: 97.8 F | WEIGHT: 155 LBS | HEART RATE: 115 BPM | OXYGEN SATURATION: 97 % | HEIGHT: 72 IN | DIASTOLIC BLOOD PRESSURE: 94 MMHG | BODY MASS INDEX: 20.99 KG/M2

## 2021-01-08 DIAGNOSIS — Z20.822 SUSPECTED COVID-19 VIRUS INFECTION: Primary | ICD-10-CM

## 2021-01-08 LAB
ALBUMIN SERPL-MCNC: 4.1 G/DL (ref 3.5–5)
ALBUMIN/GLOB SERPL: 1.1 {RATIO} (ref 1.2–3.5)
ALP SERPL-CCNC: 79 U/L (ref 50–136)
ALT SERPL-CCNC: 37 U/L (ref 12–65)
ANION GAP SERPL CALC-SCNC: 13 MMOL/L (ref 7–16)
AST SERPL-CCNC: 57 U/L (ref 15–37)
BASOPHILS # BLD: 0 K/UL (ref 0–0.2)
BASOPHILS NFR BLD: 1 % (ref 0–2)
BILIRUB SERPL-MCNC: 0.6 MG/DL (ref 0.2–1.1)
BUN SERPL-MCNC: 18 MG/DL (ref 6–23)
CALCIUM SERPL-MCNC: 8.6 MG/DL (ref 8.3–10.4)
CHLORIDE SERPL-SCNC: 103 MMOL/L (ref 98–107)
CO2 SERPL-SCNC: 22 MMOL/L (ref 21–32)
CREAT SERPL-MCNC: 0.76 MG/DL (ref 0.8–1.5)
DIFFERENTIAL METHOD BLD: ABNORMAL
EOSINOPHIL # BLD: 0.1 K/UL (ref 0–0.8)
EOSINOPHIL NFR BLD: 1 % (ref 0.5–7.8)
ERYTHROCYTE [DISTWIDTH] IN BLOOD BY AUTOMATED COUNT: 12.5 % (ref 11.9–14.6)
GLOBULIN SER CALC-MCNC: 3.9 G/DL (ref 2.3–3.5)
GLUCOSE SERPL-MCNC: 66 MG/DL (ref 65–100)
HCT VFR BLD AUTO: 51.9 % (ref 41.1–50.3)
HGB BLD-MCNC: 18.6 G/DL (ref 13.6–17.2)
IMM GRANULOCYTES # BLD AUTO: 0 K/UL (ref 0–0.5)
IMM GRANULOCYTES NFR BLD AUTO: 0 % (ref 0–5)
LIPASE SERPL-CCNC: 67 U/L (ref 73–393)
LYMPHOCYTES # BLD: 1.6 K/UL (ref 0.5–4.6)
LYMPHOCYTES NFR BLD: 38 % (ref 13–44)
MCH RBC QN AUTO: 32.9 PG (ref 26.1–32.9)
MCHC RBC AUTO-ENTMCNC: 35.8 G/DL (ref 31.4–35)
MCV RBC AUTO: 91.7 FL (ref 79.6–97.8)
MONOCYTES # BLD: 0.5 K/UL (ref 0.1–1.3)
MONOCYTES NFR BLD: 12 % (ref 4–12)
NEUTS SEG # BLD: 2 K/UL (ref 1.7–8.2)
NEUTS SEG NFR BLD: 48 % (ref 43–78)
NRBC # BLD: 0 K/UL (ref 0–0.2)
PLATELET # BLD AUTO: 155 K/UL (ref 150–450)
PMV BLD AUTO: 9.3 FL (ref 9.4–12.3)
POTASSIUM SERPL-SCNC: 4.4 MMOL/L (ref 3.5–5.1)
PROT SERPL-MCNC: 8 G/DL (ref 6.3–8.2)
RBC # BLD AUTO: 5.66 M/UL (ref 4.23–5.6)
SODIUM SERPL-SCNC: 138 MMOL/L (ref 136–145)
WBC # BLD AUTO: 4.1 K/UL (ref 4.3–11.1)

## 2021-01-08 PROCEDURE — 85025 COMPLETE CBC W/AUTO DIFF WBC: CPT

## 2021-01-08 PROCEDURE — 74011250636 HC RX REV CODE- 250/636: Performed by: PHYSICIAN ASSISTANT

## 2021-01-08 PROCEDURE — 96361 HYDRATE IV INFUSION ADD-ON: CPT

## 2021-01-08 PROCEDURE — 83690 ASSAY OF LIPASE: CPT

## 2021-01-08 PROCEDURE — 87635 SARS-COV-2 COVID-19 AMP PRB: CPT

## 2021-01-08 PROCEDURE — 99284 EMERGENCY DEPT VISIT MOD MDM: CPT

## 2021-01-08 PROCEDURE — 96372 THER/PROPH/DIAG INJ SC/IM: CPT

## 2021-01-08 PROCEDURE — 71045 X-RAY EXAM CHEST 1 VIEW: CPT

## 2021-01-08 PROCEDURE — 80053 COMPREHEN METABOLIC PANEL: CPT

## 2021-01-08 RX ORDER — KETOROLAC TROMETHAMINE 30 MG/ML
60 INJECTION, SOLUTION INTRAMUSCULAR; INTRAVENOUS
Status: COMPLETED | OUTPATIENT
Start: 2021-01-08 | End: 2021-01-08

## 2021-01-08 RX ORDER — ONDANSETRON 8 MG/1
8 TABLET, ORALLY DISINTEGRATING ORAL
Qty: 12 TAB | Refills: 0 | Status: SHIPPED | OUTPATIENT
Start: 2021-01-08 | End: 2021-07-26 | Stop reason: ALTCHOICE

## 2021-01-08 RX ADMIN — KETOROLAC TROMETHAMINE 60 MG: 30 INJECTION, SOLUTION INTRAMUSCULAR at 13:27

## 2021-01-08 RX ADMIN — SODIUM CHLORIDE 1000 ML: 900 INJECTION, SOLUTION INTRAVENOUS at 13:48

## 2021-01-08 NOTE — ED PROVIDER NOTES
Patient to ER via EMS complaining of headache body aches nausea vomiting diarrhea for the past week his roommate was diagnosed with Covid recently. He states he has not had any diarrhea stools for the past 2 to 3 days but has not eaten anything due to decreased appetite but has been drinking fluids. Take any over-the-counter medicines for any symptomatic relief    The history is provided by the patient. Headache   This is a new problem. The current episode started more than 2 days ago. The problem occurs constantly. The problem has not changed since onset. Associated with: possilbe covid exposure. The pain is located in the generalized region. The quality of the pain is described as dull. The pain is at a severity of 5/10. The pain is mild. Associated symptoms include nausea. Associated symptoms comments: Diarrhea body aches. He has tried nothing for the symptoms. The treatment provided no relief. Past Medical History:   Diagnosis Date    Ill-defined condition     ETOH    Psychiatric disorder     anxiety/depression       No past surgical history on file. No family history on file. Social History     Socioeconomic History    Marital status:      Spouse name: Not on file    Number of children: Not on file    Years of education: Not on file    Highest education level: Not on file   Occupational History    Not on file   Social Needs    Financial resource strain: Not on file    Food insecurity     Worry: Not on file     Inability: Not on file    Transportation needs     Medical: Not on file     Non-medical: Not on file   Tobacco Use    Smoking status: Former Smoker     Quit date: 1/2/2006     Years since quitting: 15.0    Smokeless tobacco: Never Used   Substance and Sexual Activity    Alcohol use:  Yes     Alcohol/week: 34.2 standard drinks     Types: 14 Glasses of wine, 6 Cans of beer, 21 Standard drinks or equivalent per week     Comment: daily 2-3     Drug use: No    Sexual Pre-procedure testing activity: Not on file   Lifestyle    Physical activity     Days per week: Not on file     Minutes per session: Not on file    Stress: Not on file   Relationships    Social connections     Talks on phone: Not on file     Gets together: Not on file     Attends Denominational service: Not on file     Active member of club or organization: Not on file     Attends meetings of clubs or organizations: Not on file     Relationship status: Not on file    Intimate partner violence     Fear of current or ex partner: Not on file     Emotionally abused: Not on file     Physically abused: Not on file     Forced sexual activity: Not on file   Other Topics Concern    Not on file   Social History Narrative    Not on file         ALLERGIES: Patient has no known allergies. Review of Systems   Gastrointestinal: Positive for nausea. Neurological: Positive for headaches. All other systems reviewed and are negative. Vitals:    01/08/21 1200 01/08/21 1230 01/08/21 1300 01/08/21 1330   BP: (!) 137/94 (!) 134/98 (!) 129/95 (!) 152/94   Pulse: 92 89 98 (!) 115   Resp:       Temp:       SpO2: 97% 99% 99% 97%   Weight:       Height:                Physical Exam  Vitals signs and nursing note reviewed. Constitutional:       General: He is not in acute distress. Appearance: He is well-developed. He is ill-appearing. He is not diaphoretic. HENT:      Head: Normocephalic and atraumatic. Nose: Congestion present. Eyes:      Pupils: Pupils are equal, round, and reactive to light. Neck:      Musculoskeletal: Normal range of motion and neck supple. Cardiovascular:      Rate and Rhythm: Normal rate and regular rhythm. Pulmonary:      Effort: Pulmonary effort is normal.      Breath sounds: Normal breath sounds. No wheezing or rhonchi. Abdominal:      General: Abdomen is flat. Bowel sounds are normal.      Palpations: Abdomen is soft. Tenderness: There is no abdominal tenderness. Hernia: No hernia is present. Musculoskeletal: Normal range of motion. Skin:     General: Skin is warm. Neurological:      General: No focal deficit present. Mental Status: He is alert and oriented to person, place, and time.    Psychiatric:         Mood and Affect: Mood normal.         Behavior: Behavior normal.          MDM  Number of Diagnoses or Management Options  Diagnosis management comments: Chest x ray w/o acute process, cbc, cmp lipase unremarkable   Pt feeling better post ivf, toradol  Suspect covid, test done in er  rx for zofran, work note given       Amount and/or Complexity of Data Reviewed  Clinical lab tests: ordered and reviewed  Tests in the radiology section of CPT®: ordered and reviewed  Review and summarize past medical records: yes    Risk of Complications, Morbidity, and/or Mortality  Presenting problems: moderate  Diagnostic procedures: low  Management options: low    Patient Progress  Patient progress: improved         Procedures

## 2021-01-08 NOTE — LETTER
[address] [address] EMERGENCY DEPT 
[address] 
[place] [id number] [phone] Work/School Note Date: 1/8/2021 To Whom It May concern: 
 
Norval Lesches was evaulated by the following provider(s): 
Attending Provider: Jake Stokes MD 
Physician Assistant: Roxi Nava virus is suspected. Per the CDC guidelines we recommend home isolation until the following conditions are all met: 1. At least 10 days have passed since symptoms first appeared and 2. At least 24 hours have passed since last fever without the use of fever-reducing medications and 
3. Symptoms (e.g., cough, shortness of breath) have improved Sincerely, ELIZABET Flores

## 2021-01-08 NOTE — ED TRIAGE NOTES
Patient arrives from home mask in place via EMS. Patient has roommate who is positive for covid. Patient has Headache, body aches, nausea, vomiting and diarrhea x 6 days.

## 2021-01-08 NOTE — DISCHARGE INSTRUCTIONS
Push fluids, counter meds for symptomatic relief, call with test results and you can check  mychart.  Alternate tylenol and motrin for fever and body aches

## 2021-01-09 LAB
SARS COV-2, XPGCVT: POSITIVE
SOURCE, COVRS: ABNORMAL

## 2021-01-10 ENCOUNTER — HOSPITAL ENCOUNTER (EMERGENCY)
Age: 41
Discharge: ANOTHER HOSPITAL | DRG: 896 | End: 2021-01-10
Attending: EMERGENCY MEDICINE | Admitting: INTERNAL MEDICINE
Payer: COMMERCIAL

## 2021-01-10 ENCOUNTER — HOSPITAL ENCOUNTER (INPATIENT)
Age: 41
LOS: 1 days | Discharge: HOME | DRG: 896 | End: 2021-01-11
Attending: HOSPITALIST | Admitting: INTERNAL MEDICINE
Payer: COMMERCIAL

## 2021-01-10 ENCOUNTER — APPOINTMENT (OUTPATIENT)
Dept: GENERAL RADIOLOGY | Age: 41
DRG: 896 | End: 2021-01-10
Attending: EMERGENCY MEDICINE
Payer: COMMERCIAL

## 2021-01-10 VITALS
DIASTOLIC BLOOD PRESSURE: 86 MMHG | HEART RATE: 83 BPM | OXYGEN SATURATION: 100 % | RESPIRATION RATE: 20 BRPM | SYSTOLIC BLOOD PRESSURE: 134 MMHG | TEMPERATURE: 97.9 F

## 2021-01-10 DIAGNOSIS — R11.2 NAUSEA AND VOMITING, INTRACTABILITY OF VOMITING NOT SPECIFIED, UNSPECIFIED VOMITING TYPE: Primary | ICD-10-CM

## 2021-01-10 DIAGNOSIS — E87.20 LACTIC ACIDOSIS: ICD-10-CM

## 2021-01-10 PROBLEM — Z78.9 ALCOHOL USE: Chronic | Status: ACTIVE | Noted: 2021-01-10

## 2021-01-10 PROBLEM — U07.1 COVID-19: Status: ACTIVE | Noted: 2021-01-10

## 2021-01-10 LAB
ALBUMIN SERPL-MCNC: 4.2 G/DL (ref 3.5–5)
ALBUMIN/GLOB SERPL: 1.2 {RATIO} (ref 1.2–3.5)
ALP SERPL-CCNC: 67 U/L (ref 50–136)
ALT SERPL-CCNC: 28 U/L (ref 12–65)
ANION GAP SERPL CALC-SCNC: 25 MMOL/L (ref 7–16)
APPEARANCE UR: CLEAR
ARTERIAL PATENCY WRIST A: ABNORMAL
AST SERPL-CCNC: 36 U/L (ref 15–37)
BACTERIA URNS QL MICRO: 0 /HPF
BASE DEFICIT BLD-SCNC: 10 MMOL/L
BASOPHILS # BLD: 0 K/UL (ref 0–0.2)
BASOPHILS NFR BLD: 1 % (ref 0–2)
BDY SITE: ABNORMAL
BILIRUB SERPL-MCNC: 0.7 MG/DL (ref 0.2–1.1)
BILIRUB UR QL: NEGATIVE
BUN SERPL-MCNC: 17 MG/DL (ref 6–23)
CALCIUM SERPL-MCNC: 8.5 MG/DL (ref 8.3–10.4)
CASTS URNS QL MICRO: ABNORMAL /LPF
CHLORIDE SERPL-SCNC: 99 MMOL/L (ref 98–107)
CO2 BLD-SCNC: 14 MMOL/L
CO2 SERPL-SCNC: 15 MMOL/L (ref 21–32)
COLLECT TIME,HTIME: 1040
COLOR UR: YELLOW
CREAT SERPL-MCNC: 0.8 MG/DL (ref 0.8–1.5)
DIFFERENTIAL METHOD BLD: ABNORMAL
EOSINOPHIL # BLD: 0.1 K/UL (ref 0–0.8)
EOSINOPHIL NFR BLD: 1 % (ref 0.5–7.8)
EPI CELLS #/AREA URNS HPF: ABNORMAL /HPF
ERYTHROCYTE [DISTWIDTH] IN BLOOD BY AUTOMATED COUNT: 12.5 % (ref 11.9–14.6)
GAS FLOW.O2 O2 DELIVERY SYS: ABNORMAL L/MIN
GLOBULIN SER CALC-MCNC: 3.4 G/DL (ref 2.3–3.5)
GLUCOSE SERPL-MCNC: 77 MG/DL (ref 65–100)
GLUCOSE UR STRIP.AUTO-MCNC: NEGATIVE MG/DL
HCO3 BLD-SCNC: 13.4 MMOL/L (ref 22–26)
HCT VFR BLD AUTO: 49.5 % (ref 41.1–50.3)
HGB BLD-MCNC: 17.8 G/DL (ref 13.6–17.2)
HGB UR QL STRIP: NEGATIVE
IMM GRANULOCYTES # BLD AUTO: 0 K/UL (ref 0–0.5)
IMM GRANULOCYTES NFR BLD AUTO: 1 % (ref 0–5)
KETONES UR QL STRIP.AUTO: >80 MG/DL
LACTATE SERPL-SCNC: 1.2 MMOL/L (ref 0.4–2)
LACTATE SERPL-SCNC: 1.4 MMOL/L (ref 0.4–2)
LACTATE SERPL-SCNC: 4.2 MMOL/L (ref 0.4–2)
LACTATE SERPL-SCNC: 8.5 MMOL/L (ref 0.4–2)
LEUKOCYTE ESTERASE UR QL STRIP.AUTO: NEGATIVE
LIPASE SERPL-CCNC: 79 U/L (ref 73–393)
LYMPHOCYTES # BLD: 1.3 K/UL (ref 0.5–4.6)
LYMPHOCYTES NFR BLD: 23 % (ref 13–44)
MAGNESIUM SERPL-MCNC: 2.1 MG/DL (ref 1.8–2.4)
MCH RBC QN AUTO: 32.6 PG (ref 26.1–32.9)
MCHC RBC AUTO-ENTMCNC: 36 G/DL (ref 31.4–35)
MCV RBC AUTO: 90.7 FL (ref 79.6–97.8)
MONOCYTES # BLD: 0.8 K/UL (ref 0.1–1.3)
MONOCYTES NFR BLD: 14 % (ref 4–12)
NEUTS SEG # BLD: 3.4 K/UL (ref 1.7–8.2)
NEUTS SEG NFR BLD: 61 % (ref 43–78)
NITRITE UR QL STRIP.AUTO: NEGATIVE
NRBC # BLD: 0 K/UL (ref 0–0.2)
PCO2 BLD: 22.4 MMHG (ref 35–45)
PH BLD: 7.38 [PH] (ref 7.35–7.45)
PH UR STRIP: 6 [PH] (ref 5–9)
PLATELET # BLD AUTO: 148 K/UL (ref 150–450)
PMV BLD AUTO: 9.5 FL (ref 9.4–12.3)
PO2 BLD: 109 MMHG (ref 75–100)
POTASSIUM SERPL-SCNC: 3.5 MMOL/L (ref 3.5–5.1)
PROT SERPL-MCNC: 7.6 G/DL (ref 6.3–8.2)
PROT UR STRIP-MCNC: 30 MG/DL
RBC # BLD AUTO: 5.46 M/UL (ref 4.23–5.6)
RBC #/AREA URNS HPF: ABNORMAL /HPF
SAO2 % BLD: 98 % (ref 95–98)
SERVICE CMNT-IMP: ABNORMAL
SERVICE CMNT-IMP: ABNORMAL
SODIUM SERPL-SCNC: 139 MMOL/L (ref 136–145)
SP GR UR REFRACTOMETRY: 1.03 (ref 1–1.02)
SPECIMEN TYPE: ABNORMAL
UROBILINOGEN UR QL STRIP.AUTO: 0.2 EU/DL (ref 0.2–1)
WBC # BLD AUTO: 5.6 K/UL (ref 4.3–11.1)
WBC URNS QL MICRO: ABNORMAL /HPF

## 2021-01-10 PROCEDURE — 96376 TX/PRO/DX INJ SAME DRUG ADON: CPT

## 2021-01-10 PROCEDURE — 83605 ASSAY OF LACTIC ACID: CPT

## 2021-01-10 PROCEDURE — 85025 COMPLETE CBC W/AUTO DIFF WBC: CPT

## 2021-01-10 PROCEDURE — 36600 WITHDRAWAL OF ARTERIAL BLOOD: CPT

## 2021-01-10 PROCEDURE — 74011250637 HC RX REV CODE- 250/637: Performed by: INTERNAL MEDICINE

## 2021-01-10 PROCEDURE — 74011250636 HC RX REV CODE- 250/636: Performed by: INTERNAL MEDICINE

## 2021-01-10 PROCEDURE — 96375 TX/PRO/DX INJ NEW DRUG ADDON: CPT

## 2021-01-10 PROCEDURE — 71045 X-RAY EXAM CHEST 1 VIEW: CPT

## 2021-01-10 PROCEDURE — 80053 COMPREHEN METABOLIC PANEL: CPT

## 2021-01-10 PROCEDURE — 74011000258 HC RX REV CODE- 258: Performed by: INTERNAL MEDICINE

## 2021-01-10 PROCEDURE — 83690 ASSAY OF LIPASE: CPT

## 2021-01-10 PROCEDURE — 65660000000 HC RM CCU STEPDOWN

## 2021-01-10 PROCEDURE — 87040 BLOOD CULTURE FOR BACTERIA: CPT

## 2021-01-10 PROCEDURE — 74011250636 HC RX REV CODE- 250/636: Performed by: EMERGENCY MEDICINE

## 2021-01-10 PROCEDURE — 74011250636 HC RX REV CODE- 250/636: Performed by: HOSPITALIST

## 2021-01-10 PROCEDURE — 99283 EMERGENCY DEPT VISIT LOW MDM: CPT

## 2021-01-10 PROCEDURE — 96365 THER/PROPH/DIAG IV INF INIT: CPT

## 2021-01-10 PROCEDURE — 81001 URINALYSIS AUTO W/SCOPE: CPT

## 2021-01-10 PROCEDURE — 83735 ASSAY OF MAGNESIUM: CPT

## 2021-01-10 PROCEDURE — 36415 COLL VENOUS BLD VENIPUNCTURE: CPT

## 2021-01-10 PROCEDURE — 82803 BLOOD GASES ANY COMBINATION: CPT

## 2021-01-10 PROCEDURE — 86900 BLOOD TYPING SEROLOGIC ABO: CPT

## 2021-01-10 PROCEDURE — 74011000250 HC RX REV CODE- 250: Performed by: INTERNAL MEDICINE

## 2021-01-10 PROCEDURE — 74011000258 HC RX REV CODE- 258: Performed by: EMERGENCY MEDICINE

## 2021-01-10 RX ORDER — ENOXAPARIN SODIUM 100 MG/ML
30 INJECTION SUBCUTANEOUS EVERY 12 HOURS
Status: DISCONTINUED | OUTPATIENT
Start: 2021-01-10 | End: 2021-01-11 | Stop reason: HOSPADM

## 2021-01-10 RX ORDER — UREA 10 %
220 LOTION (ML) TOPICAL DAILY
Status: CANCELLED | OUTPATIENT
Start: 2021-01-11

## 2021-01-10 RX ORDER — TRAZODONE HYDROCHLORIDE 50 MG/1
150 TABLET ORAL
Status: DISCONTINUED | OUTPATIENT
Start: 2021-01-10 | End: 2021-01-11 | Stop reason: HOSPADM

## 2021-01-10 RX ORDER — POLYETHYLENE GLYCOL 3350 17 G/17G
17 POWDER, FOR SOLUTION ORAL DAILY PRN
Status: CANCELLED | OUTPATIENT
Start: 2021-01-10

## 2021-01-10 RX ORDER — ASCORBIC ACID 500 MG
500 TABLET ORAL 3 TIMES DAILY
Status: CANCELLED | OUTPATIENT
Start: 2021-01-10

## 2021-01-10 RX ORDER — SODIUM CHLORIDE 0.9 % (FLUSH) 0.9 %
5-40 SYRINGE (ML) INJECTION AS NEEDED
Status: CANCELLED | OUTPATIENT
Start: 2021-01-10

## 2021-01-10 RX ORDER — TRAZODONE HYDROCHLORIDE 50 MG/1
150 TABLET ORAL
Status: CANCELLED | OUTPATIENT
Start: 2021-01-10

## 2021-01-10 RX ORDER — ONDANSETRON 2 MG/ML
4 INJECTION INTRAMUSCULAR; INTRAVENOUS
Status: CANCELLED | OUTPATIENT
Start: 2021-01-10

## 2021-01-10 RX ORDER — SODIUM CHLORIDE 9 MG/ML
100 INJECTION, SOLUTION INTRAVENOUS CONTINUOUS
Status: CANCELLED | OUTPATIENT
Start: 2021-01-10

## 2021-01-10 RX ORDER — ACETAMINOPHEN 325 MG/1
650 TABLET ORAL
Status: DISCONTINUED | OUTPATIENT
Start: 2021-01-10 | End: 2021-01-10 | Stop reason: HOSPADM

## 2021-01-10 RX ORDER — ACETAMINOPHEN 325 MG/1
650 TABLET ORAL
Status: CANCELLED | OUTPATIENT
Start: 2021-01-10

## 2021-01-10 RX ORDER — SODIUM CHLORIDE 9 MG/ML
100 INJECTION, SOLUTION INTRAVENOUS CONTINUOUS
Status: DISCONTINUED | OUTPATIENT
Start: 2021-01-10 | End: 2021-01-10 | Stop reason: HOSPADM

## 2021-01-10 RX ORDER — TRAZODONE HYDROCHLORIDE 50 MG/1
150 TABLET ORAL
Status: DISCONTINUED | OUTPATIENT
Start: 2021-01-10 | End: 2021-01-10 | Stop reason: HOSPADM

## 2021-01-10 RX ORDER — SODIUM CHLORIDE 0.9 % (FLUSH) 0.9 %
5-40 SYRINGE (ML) INJECTION EVERY 8 HOURS
Status: CANCELLED | OUTPATIENT
Start: 2021-01-10

## 2021-01-10 RX ORDER — UREA 10 %
220 LOTION (ML) TOPICAL DAILY
Status: DISCONTINUED | OUTPATIENT
Start: 2021-01-11 | End: 2021-01-11 | Stop reason: HOSPADM

## 2021-01-10 RX ORDER — ONDANSETRON 2 MG/ML
4 INJECTION INTRAMUSCULAR; INTRAVENOUS
Status: DISCONTINUED | OUTPATIENT
Start: 2021-01-10 | End: 2021-01-11 | Stop reason: HOSPADM

## 2021-01-10 RX ORDER — SODIUM CHLORIDE 9 MG/ML
100 INJECTION, SOLUTION INTRAVENOUS CONTINUOUS
Status: DISCONTINUED | OUTPATIENT
Start: 2021-01-10 | End: 2021-01-11 | Stop reason: HOSPADM

## 2021-01-10 RX ORDER — POTASSIUM CHLORIDE 14.9 MG/ML
20 INJECTION INTRAVENOUS
Status: DISCONTINUED | OUTPATIENT
Start: 2021-01-10 | End: 2021-01-10 | Stop reason: HOSPADM

## 2021-01-10 RX ORDER — GABAPENTIN 300 MG/1
300 CAPSULE ORAL 3 TIMES DAILY
Status: CANCELLED | OUTPATIENT
Start: 2021-01-10

## 2021-01-10 RX ORDER — ONDANSETRON 2 MG/ML
4 INJECTION INTRAMUSCULAR; INTRAVENOUS
Status: COMPLETED | OUTPATIENT
Start: 2021-01-10 | End: 2021-01-10

## 2021-01-10 RX ORDER — CHLORDIAZEPOXIDE HYDROCHLORIDE 10 MG/1
10 CAPSULE, GELATIN COATED ORAL 3 TIMES DAILY
Status: DISCONTINUED | OUTPATIENT
Start: 2021-01-10 | End: 2021-01-10 | Stop reason: HOSPADM

## 2021-01-10 RX ORDER — QUETIAPINE FUMARATE 25 MG/1
50 TABLET, FILM COATED ORAL 2 TIMES DAILY
Status: DISCONTINUED | OUTPATIENT
Start: 2021-01-10 | End: 2021-01-10 | Stop reason: HOSPADM

## 2021-01-10 RX ORDER — CHLORDIAZEPOXIDE HYDROCHLORIDE 10 MG/1
10 CAPSULE, GELATIN COATED ORAL 3 TIMES DAILY
Status: CANCELLED | OUTPATIENT
Start: 2021-01-10

## 2021-01-10 RX ORDER — POTASSIUM CHLORIDE 14.9 MG/ML
20 INJECTION INTRAVENOUS
Status: CANCELLED | OUTPATIENT
Start: 2021-01-10 | End: 2021-01-10

## 2021-01-10 RX ORDER — LORAZEPAM 2 MG/ML
1 INJECTION INTRAMUSCULAR
Status: COMPLETED | OUTPATIENT
Start: 2021-01-10 | End: 2021-01-10

## 2021-01-10 RX ORDER — POTASSIUM CHLORIDE 14.9 MG/ML
20 INJECTION INTRAVENOUS
Status: COMPLETED | OUTPATIENT
Start: 2021-01-10 | End: 2021-01-10

## 2021-01-10 RX ORDER — ACETAMINOPHEN 650 MG/1
650 SUPPOSITORY RECTAL
Status: CANCELLED | OUTPATIENT
Start: 2021-01-10

## 2021-01-10 RX ORDER — UREA 10 %
220 LOTION (ML) TOPICAL DAILY
Status: DISCONTINUED | OUTPATIENT
Start: 2021-01-10 | End: 2021-01-10 | Stop reason: HOSPADM

## 2021-01-10 RX ORDER — QUETIAPINE FUMARATE 25 MG/1
50 TABLET, FILM COATED ORAL 2 TIMES DAILY
Status: DISCONTINUED | OUTPATIENT
Start: 2021-01-11 | End: 2021-01-11 | Stop reason: HOSPADM

## 2021-01-10 RX ORDER — LORAZEPAM 2 MG/ML
1 INJECTION INTRAMUSCULAR
Status: DISCONTINUED | OUTPATIENT
Start: 2021-01-10 | End: 2021-01-10 | Stop reason: HOSPADM

## 2021-01-10 RX ORDER — CHLORDIAZEPOXIDE HYDROCHLORIDE 10 MG/1
10 CAPSULE, GELATIN COATED ORAL 3 TIMES DAILY
Status: DISCONTINUED | OUTPATIENT
Start: 2021-01-10 | End: 2021-01-11 | Stop reason: HOSPADM

## 2021-01-10 RX ORDER — UREA 10 %
220 LOTION (ML) TOPICAL DAILY
Status: CANCELLED | OUTPATIENT
Start: 2021-01-10

## 2021-01-10 RX ORDER — SODIUM CHLORIDE 0.9 % (FLUSH) 0.9 %
5-40 SYRINGE (ML) INJECTION AS NEEDED
Status: DISCONTINUED | OUTPATIENT
Start: 2021-01-10 | End: 2021-01-10 | Stop reason: HOSPADM

## 2021-01-10 RX ORDER — GABAPENTIN 300 MG/1
300 CAPSULE ORAL 3 TIMES DAILY
Status: DISCONTINUED | OUTPATIENT
Start: 2021-01-10 | End: 2021-01-11 | Stop reason: HOSPADM

## 2021-01-10 RX ORDER — POLYETHYLENE GLYCOL 3350 17 G/17G
17 POWDER, FOR SOLUTION ORAL DAILY PRN
Status: DISCONTINUED | OUTPATIENT
Start: 2021-01-10 | End: 2021-01-11 | Stop reason: HOSPADM

## 2021-01-10 RX ORDER — SODIUM CHLORIDE 9 MG/ML
250 INJECTION, SOLUTION INTRAVENOUS AS NEEDED
Status: CANCELLED | OUTPATIENT
Start: 2021-01-10

## 2021-01-10 RX ORDER — GABAPENTIN 300 MG/1
300 CAPSULE ORAL 3 TIMES DAILY
Status: DISCONTINUED | OUTPATIENT
Start: 2021-01-10 | End: 2021-01-10 | Stop reason: HOSPADM

## 2021-01-10 RX ORDER — ASCORBIC ACID 500 MG
500 TABLET ORAL 3 TIMES DAILY
Status: DISCONTINUED | OUTPATIENT
Start: 2021-01-10 | End: 2021-01-11 | Stop reason: HOSPADM

## 2021-01-10 RX ORDER — QUETIAPINE FUMARATE 25 MG/1
50 TABLET, FILM COATED ORAL 2 TIMES DAILY
Status: CANCELLED | OUTPATIENT
Start: 2021-01-11

## 2021-01-10 RX ORDER — ACETAMINOPHEN 325 MG/1
650 TABLET ORAL
Status: DISCONTINUED | OUTPATIENT
Start: 2021-01-10 | End: 2021-01-11 | Stop reason: HOSPADM

## 2021-01-10 RX ORDER — POLYETHYLENE GLYCOL 3350 17 G/17G
17 POWDER, FOR SOLUTION ORAL DAILY PRN
Status: DISCONTINUED | OUTPATIENT
Start: 2021-01-10 | End: 2021-01-10 | Stop reason: HOSPADM

## 2021-01-10 RX ORDER — SODIUM CHLORIDE 9 MG/ML
250 INJECTION, SOLUTION INTRAVENOUS AS NEEDED
Status: DISCONTINUED | OUTPATIENT
Start: 2021-01-10 | End: 2021-01-10 | Stop reason: HOSPADM

## 2021-01-10 RX ORDER — ACETAMINOPHEN 650 MG/1
650 SUPPOSITORY RECTAL
Status: DISCONTINUED | OUTPATIENT
Start: 2021-01-10 | End: 2021-01-10 | Stop reason: HOSPADM

## 2021-01-10 RX ORDER — ACETAMINOPHEN 650 MG/1
650 SUPPOSITORY RECTAL
Status: DISCONTINUED | OUTPATIENT
Start: 2021-01-10 | End: 2021-01-11 | Stop reason: HOSPADM

## 2021-01-10 RX ORDER — MIRTAZAPINE 15 MG/1
45 TABLET, FILM COATED ORAL
Status: DISCONTINUED | OUTPATIENT
Start: 2021-01-10 | End: 2021-01-11 | Stop reason: HOSPADM

## 2021-01-10 RX ORDER — LORAZEPAM 2 MG/ML
1 INJECTION INTRAMUSCULAR
Status: CANCELLED | OUTPATIENT
Start: 2021-01-10

## 2021-01-10 RX ORDER — MAG HYDROX/ALUMINUM HYD/SIMETH 200-200-20
30 SUSPENSION, ORAL (FINAL DOSE FORM) ORAL
Status: DISCONTINUED | OUTPATIENT
Start: 2021-01-10 | End: 2021-01-11 | Stop reason: HOSPADM

## 2021-01-10 RX ORDER — SODIUM CHLORIDE 0.9 % (FLUSH) 0.9 %
5-40 SYRINGE (ML) INJECTION AS NEEDED
Status: DISCONTINUED | OUTPATIENT
Start: 2021-01-10 | End: 2021-01-11 | Stop reason: HOSPADM

## 2021-01-10 RX ORDER — ONDANSETRON 2 MG/ML
4 INJECTION INTRAMUSCULAR; INTRAVENOUS
Status: DISCONTINUED | OUTPATIENT
Start: 2021-01-10 | End: 2021-01-10 | Stop reason: HOSPADM

## 2021-01-10 RX ORDER — SODIUM CHLORIDE 9 MG/ML
250 INJECTION, SOLUTION INTRAVENOUS AS NEEDED
Status: DISCONTINUED | OUTPATIENT
Start: 2021-01-10 | End: 2021-01-11 | Stop reason: HOSPADM

## 2021-01-10 RX ORDER — ASCORBIC ACID 500 MG
500 TABLET ORAL 3 TIMES DAILY
Status: DISCONTINUED | OUTPATIENT
Start: 2021-01-10 | End: 2021-01-10 | Stop reason: HOSPADM

## 2021-01-10 RX ORDER — LORAZEPAM 2 MG/ML
1 INJECTION INTRAMUSCULAR
Status: DISCONTINUED | OUTPATIENT
Start: 2021-01-10 | End: 2021-01-11 | Stop reason: HOSPADM

## 2021-01-10 RX ORDER — QUETIAPINE FUMARATE 25 MG/1
50 TABLET, FILM COATED ORAL 2 TIMES DAILY
Status: CANCELLED | OUTPATIENT
Start: 2021-01-10

## 2021-01-10 RX ORDER — SODIUM CHLORIDE 0.9 % (FLUSH) 0.9 %
5-40 SYRINGE (ML) INJECTION EVERY 8 HOURS
Status: DISCONTINUED | OUTPATIENT
Start: 2021-01-10 | End: 2021-01-11 | Stop reason: HOSPADM

## 2021-01-10 RX ORDER — ENOXAPARIN SODIUM 100 MG/ML
40 INJECTION SUBCUTANEOUS EVERY 24 HOURS
Status: DISCONTINUED | OUTPATIENT
Start: 2021-01-10 | End: 2021-01-10

## 2021-01-10 RX ORDER — SODIUM CHLORIDE 0.9 % (FLUSH) 0.9 %
5-40 SYRINGE (ML) INJECTION EVERY 8 HOURS
Status: DISCONTINUED | OUTPATIENT
Start: 2021-01-10 | End: 2021-01-10 | Stop reason: HOSPADM

## 2021-01-10 RX ADMIN — POTASSIUM CHLORIDE 20 MEQ: 14.9 INJECTION, SOLUTION INTRAVENOUS at 21:39

## 2021-01-10 RX ADMIN — AZITHROMYCIN MONOHYDRATE 500 MG: 500 INJECTION, POWDER, LYOPHILIZED, FOR SOLUTION INTRAVENOUS at 17:22

## 2021-01-10 RX ADMIN — SODIUM CHLORIDE 100 ML/HR: 900 INJECTION, SOLUTION INTRAVENOUS at 17:21

## 2021-01-10 RX ADMIN — ALUMINUM HYDROXIDE, MAGNESIUM HYDROXIDE, AND SIMETHICONE 30 ML: 200; 200; 20 SUSPENSION ORAL at 23:37

## 2021-01-10 RX ADMIN — ENOXAPARIN SODIUM 30 MG: 30 INJECTION SUBCUTANEOUS at 21:41

## 2021-01-10 RX ADMIN — ONDANSETRON 4 MG: 2 INJECTION INTRAMUSCULAR; INTRAVENOUS at 07:39

## 2021-01-10 RX ADMIN — LORAZEPAM 1 MG: 2 INJECTION INTRAMUSCULAR; INTRAVENOUS at 17:21

## 2021-01-10 RX ADMIN — CEFTRIAXONE 1 G: 1 INJECTION, POWDER, FOR SOLUTION INTRAMUSCULAR; INTRAVENOUS at 09:40

## 2021-01-10 RX ADMIN — SODIUM CHLORIDE 1000 ML: 900 INJECTION, SOLUTION INTRAVENOUS at 09:32

## 2021-01-10 RX ADMIN — Medication 220 MG: at 17:21

## 2021-01-10 RX ADMIN — QUETIAPINE FUMARATE 50 MG: 25 TABLET ORAL at 17:21

## 2021-01-10 RX ADMIN — FOLIC ACID: 5 INJECTION, SOLUTION INTRAMUSCULAR; INTRAVENOUS; SUBCUTANEOUS at 17:22

## 2021-01-10 RX ADMIN — LORAZEPAM 1 MG: 2 INJECTION INTRAMUSCULAR; INTRAVENOUS at 09:32

## 2021-01-10 RX ADMIN — Medication 10 ML: at 21:39

## 2021-01-10 RX ADMIN — ONDANSETRON 4 MG: 2 INJECTION INTRAMUSCULAR; INTRAVENOUS at 09:31

## 2021-01-10 RX ADMIN — MIRTAZAPINE 45 MG: 15 TABLET, FILM COATED ORAL at 21:37

## 2021-01-10 RX ADMIN — SODIUM CHLORIDE 100 ML/HR: 900 INJECTION, SOLUTION INTRAVENOUS at 21:36

## 2021-01-10 RX ADMIN — SODIUM CHLORIDE 1000 ML: 900 INJECTION, SOLUTION INTRAVENOUS at 07:39

## 2021-01-10 RX ADMIN — ACETAMINOPHEN 650 MG: 325 TABLET, FILM COATED ORAL at 23:29

## 2021-01-10 RX ADMIN — GABAPENTIN 300 MG: 300 CAPSULE ORAL at 21:38

## 2021-01-10 RX ADMIN — OXYCODONE HYDROCHLORIDE AND ACETAMINOPHEN 500 MG: 500 TABLET ORAL at 21:38

## 2021-01-10 RX ADMIN — ONDANSETRON 4 MG: 2 INJECTION INTRAMUSCULAR; INTRAVENOUS at 17:21

## 2021-01-10 RX ADMIN — GABAPENTIN 300 MG: 300 CAPSULE ORAL at 17:21

## 2021-01-10 RX ADMIN — TRAZODONE HYDROCHLORIDE 150 MG: 50 TABLET ORAL at 21:37

## 2021-01-10 RX ADMIN — OXYCODONE HYDROCHLORIDE AND ACETAMINOPHEN 500 MG: 500 TABLET ORAL at 17:21

## 2021-01-10 RX ADMIN — POTASSIUM CHLORIDE 20 MEQ: 14.9 INJECTION, SOLUTION INTRAVENOUS at 17:22

## 2021-01-10 NOTE — PROGRESS NOTES
TRANSFER - IN REPORT:    Verbal report received from [facility]) on Ani Mckinney  being received from [facility](unit) for routine progression of care      Report consisted of patients Situation, Background, Assessment and   Recommendations(SBAR). Information from the following report(s) SBAR, Kardex, Intake/Output, MAR and Recent Results was reviewed with the receiving nurse. Opportunity for questions and clarification was provided. Assessment completed upon patients arrival to unit and care assumed.

## 2021-01-10 NOTE — ED TRIAGE NOTES
Patient comes via ems from home co nausea and vomiting for several days. Patient recently diagnosed with covid 23.   Patient states he has been able to eat or drink due to nausea

## 2021-01-10 NOTE — ED NOTES
TRANSFER - OUT REPORT:    Verbal report given to Via Florin Gorman on Haleigh Vang  being transferred to [facility] for routine progression of care       Report consisted of patients Situation, Background, Assessment and   Recommendations(SBAR). Information from the following report(s) SBAR was reviewed with the receiving nurse. Lines:   Peripheral IV 01/10/21 Right Antecubital (Active)       Peripheral IV 01/10/21 Right Wrist (Active)   Site Assessment Clean, dry, & intact 01/10/21 1701   Phlebitis Assessment 0 01/10/21 1701   Infiltration Assessment 0 01/10/21 1701        Opportunity for questions and clarification was provided.       Patient transported with:   [employer]

## 2021-01-10 NOTE — ED PROVIDER NOTES
80-year-old white male presents with nausea and vomiting for approximately a week. States he is not tolerated any intake for 6 days. Diffuse abdominal cramping. Has had subjective fever. Also reports cough and shortness of breath. Did have Covid exposure and tested positive 2 days ago. Does smoke but denies lung disease. The history is provided by the patient. Vomiting   Associated symptoms include a fever and abdominal pain. Pertinent negatives include no headaches, no cough and no headaches. Past Medical History:   Diagnosis Date    Ill-defined condition     ETOH    Psychiatric disorder     anxiety/depression       History reviewed. No pertinent surgical history. History reviewed. No pertinent family history. Social History     Socioeconomic History    Marital status:      Spouse name: Not on file    Number of children: Not on file    Years of education: Not on file    Highest education level: Not on file   Occupational History    Not on file   Social Needs    Financial resource strain: Not on file    Food insecurity     Worry: Not on file     Inability: Not on file    Transportation needs     Medical: Not on file     Non-medical: Not on file   Tobacco Use    Smoking status: Former Smoker     Quit date: 1/2/2006     Years since quitting: 15.0    Smokeless tobacco: Never Used   Substance and Sexual Activity    Alcohol use:  Yes     Alcohol/week: 34.2 standard drinks     Types: 14 Glasses of wine, 6 Cans of beer, 21 Standard drinks or equivalent per week     Comment: daily 2-3     Drug use: No    Sexual activity: Not on file   Lifestyle    Physical activity     Days per week: Not on file     Minutes per session: Not on file    Stress: Not on file   Relationships    Social connections     Talks on phone: Not on file     Gets together: Not on file     Attends Episcopal service: Not on file     Active member of club or organization: Not on file     Attends meetings of clubs or organizations: Not on file     Relationship status: Not on file    Intimate partner violence     Fear of current or ex partner: Not on file     Emotionally abused: Not on file     Physically abused: Not on file     Forced sexual activity: Not on file   Other Topics Concern    Not on file   Social History Narrative    Not on file         ALLERGIES: Patient has no known allergies. Review of Systems   Constitutional: Positive for fever. HENT: Negative for congestion. Respiratory: Negative for cough and shortness of breath. Cardiovascular: Negative for chest pain. Gastrointestinal: Positive for abdominal pain, nausea and vomiting. Genitourinary: Negative for dysuria. Musculoskeletal: Negative for back pain and neck pain. Skin: Negative for rash. Neurological: Negative for headaches. Psychiatric/Behavioral: Negative for confusion. Vitals:    01/10/21 0714   BP: (!) 146/99   Pulse: 96   Resp: 20   Temp: 97.7 °F (36.5 °C)   SpO2: 99%            Physical Exam  Vitals signs and nursing note reviewed. Constitutional:       General: He is not in acute distress. Appearance: Normal appearance. He is not toxic-appearing. HENT:      Head: Normocephalic and atraumatic. Nose: Nose normal.      Mouth/Throat:      Mouth: Mucous membranes are moist.      Pharynx: Oropharynx is clear. Eyes:      General: No scleral icterus. Conjunctiva/sclera: Conjunctivae normal.      Pupils: Pupils are equal, round, and reactive to light. Neck:      Musculoskeletal: Normal range of motion and neck supple. Cardiovascular:      Rate and Rhythm: Normal rate and regular rhythm. Heart sounds: No murmur. Pulmonary:      Effort: Pulmonary effort is normal.      Breath sounds: Normal breath sounds. Abdominal:      Palpations: Abdomen is soft. Tenderness: There is generalized abdominal tenderness. There is no guarding or rebound. Musculoskeletal: Normal range of motion. General: No swelling. Skin:     General: Skin is warm and dry. Neurological:      General: No focal deficit present. Mental Status: He is alert and oriented to person, place, and time. Psychiatric:         Mood and Affect: Mood normal.         Behavior: Behavior normal.          MDM  Number of Diagnoses or Management Options  Diagnosis management comments: Lab work is significant for elevated anion gap 25 and low CO2 at 15 and lactic acid of 8.5. Also is has ketones but no infection. Patient treated with IV fluids and Zofran. X-ray obtained due to shortness of breath and cough shows no acute abnormality. Is still nauseated and will receive second dose of Zofran. He is on third liter of saline. Patient does have known history of alcoholism and has had seizures in the past I am concerned that possibly he had an unwitnessed seizure last night leading to his elevated lactic acid. Due to significant acidosis will discuss with hospitalist for possible admission for continued hydration.        Amount and/or Complexity of Data Reviewed  Clinical lab tests: ordered and reviewed  Tests in the medicine section of CPT®: ordered and reviewed    Risk of Complications, Morbidity, and/or Mortality  Presenting problems: moderate  Diagnostic procedures: moderate  Management options: moderate           Procedures

## 2021-01-10 NOTE — H&P
Hospitalist H&P Note     Admit Date:  No admission date for patient encounter. Name:  Rock Saldaña   Age:  36 y.o.  :  1980   MRN:  [id number]   PCP:  Fallon Wade MD  Treatment Team: Attending Provider: Gayle Centeno MD    HPI:     CC:  Nausea, emesis       Mr. Merly Gonsalez is a 35 yo male with PMH of daily ETOH use, seizures,  COVID 19 diagnosed 18-21 who is evaluated with complaints of nausea, emesis and found with metabolic acidosis. He denies abdominal pain or diarrhea. He has felt progressively worse with COVID. He admits to daily ETOH use and has had prior DTs with current concern about this during this stay. He also is worried that he might be having have seizures. lives with his grandparents. 10 systems reviewed and negative except as noted in HPI. - no ear or throat pains, some dyspnea, no edema , making urine        Past Medical History:   Diagnosis Date    Ill-defined condition     ETOH    Psychiatric disorder     anxiety/depression      No past surgical history -discussed with patient   No Known Allergies   Social History     Tobacco Use    Smoking status: Former Smoker     Quit date: 2006     Years since quitting: 15.0    Smokeless tobacco: Never Used   Substance Use Topics    Alcohol use: Yes     Alcohol/week: 34.2 standard drinks     Types: 14 Glasses of wine, 6 Cans of beer, 21 Standard drinks or equivalent per week     Comment: daily 2-3       No family history-discussed with patient         Immunization History   Administered Date(s) Administered    Tdap 2014     PTA Medications:  Cannot display prior to admission medications because the patient has not been admitted in this contact. Objective:   No data found. No intake or output data in the 24 hours ending 01/10/21 1239    Physical Exam:  General:    Alert. No distress, thin and appears ill   Eyes:   Normal sclera. Extraocular movements intact.    ENT:  Normocephalic, atraumatic.  dry mucous membranes  CV:   RRR. No m/r/g. . No edema  Lungs:  CTAB. No wheezing, rhonchi, or rales. Abdomen: Soft, nontender, nondistended. Present BS  Extremities: Warm and dry. Neurologic:  grossly intact. Skin:     No rashes or jaundice. Normal coloration  Psych:  Normal mood and affect. I reviewed the labs, imaging, EKGs, telemetry, and other studies done this admission. Data Review:   Recent Results (from the past 24 hour(s))   CBC WITH AUTOMATED DIFF    Collection Time: 01/10/21  7:42 AM   Result Value Ref Range    WBC 5.6 4.3 - 11.1 K/uL    RBC 5.46 4.23 - 5.6 M/uL    HGB 17.8 (H) 13.6 - 17.2 g/dL    HCT 49.5 41.1 - 50.3 %    MCV 90.7 79.6 - 97.8 FL    MCH 32.6 26.1 - 32.9 PG    MCHC 36.0 (H) 31.4 - 35.0 g/dL    RDW 12.5 11.9 - 14.6 %    PLATELET 256 (L) 731 - 450 K/uL    MPV 9.5 9.4 - 12.3 FL    ABSOLUTE NRBC 0.00 0.0 - 0.2 K/uL    DF AUTOMATED      NEUTROPHILS 61 43 - 78 %    LYMPHOCYTES 23 13 - 44 %    MONOCYTES 14 (H) 4.0 - 12.0 %    EOSINOPHILS 1 0.5 - 7.8 %    BASOPHILS 1 0.0 - 2.0 %    IMMATURE GRANULOCYTES 1 0.0 - 5.0 %    ABS. NEUTROPHILS 3.4 1.7 - 8.2 K/UL    ABS. LYMPHOCYTES 1.3 0.5 - 4.6 K/UL    ABS. MONOCYTES 0.8 0.1 - 1.3 K/UL    ABS. EOSINOPHILS 0.1 0.0 - 0.8 K/UL    ABS. BASOPHILS 0.0 0.0 - 0.2 K/UL    ABS. IMM. GRANS. 0.0 0.0 - 0.5 K/UL   METABOLIC PANEL, COMPREHENSIVE    Collection Time: 01/10/21  7:42 AM   Result Value Ref Range    Sodium 139 136 - 145 mmol/L    Potassium 3.5 3.5 - 5.1 mmol/L    Chloride 99 98 - 107 mmol/L    CO2 15 (L) 21 - 32 mmol/L    Anion gap 25 (H) 7 - 16 mmol/L    Glucose 77 65 - 100 mg/dL    BUN 17 6 - 23 MG/DL    Creatinine 0.80 0.8 - 1.5 MG/DL    GFR est AA >60 >60 ml/min/1.73m2    GFR est non-AA >60 >60 ml/min/1.73m2    Calcium 8.5 8.3 - 10.4 MG/DL    Bilirubin, total 0.7 0.2 - 1.1 MG/DL    ALT (SGPT) 28 12 - 65 U/L    AST (SGOT) 36 15 - 37 U/L    Alk.  phosphatase 67 50 - 136 U/L    Protein, total 7.6 6.3 - 8.2 g/dL    Albumin 4.2 3.5 - 5.0 g/dL Globulin 3.4 2.3 - 3.5 g/dL    A-G Ratio 1.2 1.2 - 3.5     LIPASE    Collection Time: 01/10/21  7:42 AM   Result Value Ref Range    Lipase 79 73 - 393 U/L   LACTIC ACID    Collection Time: 01/10/21  7:42 AM   Result Value Ref Range    Lactic acid 8.5 (HH) 0.4 - 2.0 MMOL/L   POC G3    Collection Time: 01/10/21 10:45 AM   Result Value Ref Range    Device: ROOM AIR      pH (POC) 7.38 7.35 - 7.45      pCO2 (POC) 22.4 (L) 35 - 45 MMHG    pO2 (POC) 109 (H) 75 - 100 MMHG    HCO3 (POC) 13.4 (L) 22 - 26 MMOL/L    sO2 (POC) 98 95 - 98 %    Base deficit (POC) 10 mmol/L    Allens test (POC) NOT APPLICABLE      Site RIGHT BRACHIAL      Specimen type (POC) ARTERIAL      Performed by Adrian Bolivar     CO2, POC 14 MMOL/L    Respiratory comment: PhysicianNotified     COLLECT TIME 1,040         All Micro Results     None          Other Studies:  Xr Chest Port    Result Date: 1/10/2021  Exam: XR CHEST PORT on 1/10/2021 8:03 AM Clinical History: The Male patient is 36years old  presenting for sob. Comparison:  Chest x-ray 1/8/2021 Findings:  Frontal view of the chest was obtained. The lung fields are clear. No pleural effusions are demonstrated. The cardiomediastinal silhouette is within normal limits. There are no acute osseous abnormalities. Impression:  1. No acute cardiopulmonary process.  CPT code(s) 96792       Assessment and Plan:     Hospital Problems as of 1/10/2021 Never Reviewed          Codes Class Noted - Resolved POA    COVID-19 ICD-10-CM: U07.1  ICD-9-CM: 079.89  1/10/2021 - Present Yes        Alcohol use (Chronic) ICD-10-CM: Z72.89  ICD-9-CM: V49.89  1/10/2021 - Present Yes        Metabolic acidosis XPF-00-QQ: E87.2  ICD-9-CM: 276.2  1/10/2021 - Present Yes        Nausea and vomiting ICD-10-CM: R11.2  ICD-9-CM: 787.01  1/10/2021 - Present Yes              · COVID 19 with nausea, emesis and metabolic acidosis:  · Admit to remote tele   · ABG due to degree of acidosis  · followup lactate trends   · Hydrate cautiously due to COVID  · No indication of acute abdomen on exam but will need serial abd exams  · Start rocephin and azithro D1  · Decadron D1  · Convalescent plasma consented   · No remdesivir ordered to date  · Add vitamin c and zinc  · Check INR, ddimer and LDH  · O2 as needed   · Check BC x 2, UA      · ETOH use:  · IV ativan prn  · Scheduled oral librium  · IV thiamine and folate   · Check INR   · Resume home doses of neurontin, seroquel , remeron      · Seizures:  · followup neurovascular checks  · Taking only neurontin       · Hypokalemia:  · replace and repeat lab with magnesium    Discharge planning:  Pending   DVT ppx: SCD, checking INR, will add AC as indicated   Code status:  Full  Estimated LOS:  Greater than 2 midnights  Risk:  high  Care plan:   Signed:  Arthur Vallejo MD

## 2021-01-11 ENCOUNTER — APPOINTMENT (OUTPATIENT)
Dept: CT IMAGING | Age: 41
DRG: 896 | End: 2021-01-11
Attending: INTERNAL MEDICINE
Payer: COMMERCIAL

## 2021-01-11 VITALS
WEIGHT: 154.98 LBS | SYSTOLIC BLOOD PRESSURE: 131 MMHG | RESPIRATION RATE: 19 BRPM | HEART RATE: 75 BPM | TEMPERATURE: 98.3 F | OXYGEN SATURATION: 98 % | DIASTOLIC BLOOD PRESSURE: 71 MMHG | BODY MASS INDEX: 21.02 KG/M2

## 2021-01-11 LAB
ABO + RH BLD: NORMAL
ALBUMIN SERPL-MCNC: 3 G/DL (ref 3.5–5)
ALBUMIN/GLOB SERPL: 1.2 {RATIO} (ref 1.2–3.5)
ALP SERPL-CCNC: 47 U/L (ref 50–136)
ALT SERPL-CCNC: 20 U/L (ref 12–65)
ANION GAP SERPL CALC-SCNC: 8 MMOL/L (ref 7–16)
AST SERPL-CCNC: 31 U/L (ref 15–37)
BASOPHILS # BLD: 0 K/UL (ref 0–0.2)
BASOPHILS NFR BLD: 1 % (ref 0–2)
BILIRUB SERPL-MCNC: 1.3 MG/DL (ref 0.2–1.1)
BLOOD GROUP ANTIBODIES SERPL: NORMAL
BUN SERPL-MCNC: 13 MG/DL (ref 6–23)
CALCIUM SERPL-MCNC: 7.2 MG/DL (ref 8.3–10.4)
CHLORIDE SERPL-SCNC: 109 MMOL/L (ref 98–107)
CO2 SERPL-SCNC: 23 MMOL/L (ref 21–32)
CREAT SERPL-MCNC: 0.57 MG/DL (ref 0.8–1.5)
DIFFERENTIAL METHOD BLD: ABNORMAL
EOSINOPHIL # BLD: 0.1 K/UL (ref 0–0.8)
EOSINOPHIL NFR BLD: 4 % (ref 0.5–7.8)
ERYTHROCYTE [DISTWIDTH] IN BLOOD BY AUTOMATED COUNT: 12.7 % (ref 11.9–14.6)
GLOBULIN SER CALC-MCNC: 2.5 G/DL (ref 2.3–3.5)
GLUCOSE SERPL-MCNC: 71 MG/DL (ref 65–100)
HCT VFR BLD AUTO: 38.2 % (ref 41.1–50.3)
HGB BLD-MCNC: 13.6 G/DL (ref 13.6–17.2)
IMM GRANULOCYTES # BLD AUTO: 0 K/UL (ref 0–0.5)
IMM GRANULOCYTES NFR BLD AUTO: 1 % (ref 0–5)
LACTATE SERPL-SCNC: 0.6 MMOL/L (ref 0.4–2)
LYMPHOCYTES # BLD: 1.6 K/UL (ref 0.5–4.6)
LYMPHOCYTES NFR BLD: 44 % (ref 13–44)
MCH RBC QN AUTO: 32.9 PG (ref 26.1–32.9)
MCHC RBC AUTO-ENTMCNC: 35.6 G/DL (ref 31.4–35)
MCV RBC AUTO: 92.3 FL (ref 79.6–97.8)
MONOCYTES # BLD: 0.6 K/UL (ref 0.1–1.3)
MONOCYTES NFR BLD: 17 % (ref 4–12)
NEUTS SEG # BLD: 1.2 K/UL (ref 1.7–8.2)
NEUTS SEG NFR BLD: 34 % (ref 43–78)
NRBC # BLD: 0 K/UL (ref 0–0.2)
PLATELET # BLD AUTO: 95 K/UL (ref 150–450)
PMV BLD AUTO: 9.6 FL (ref 9.4–12.3)
POTASSIUM SERPL-SCNC: 3.9 MMOL/L (ref 3.5–5.1)
PROT SERPL-MCNC: 5.5 G/DL (ref 6.3–8.2)
RBC # BLD AUTO: 4.14 M/UL (ref 4.23–5.6)
SODIUM SERPL-SCNC: 140 MMOL/L (ref 136–145)
SPECIMEN EXP DATE BLD: NORMAL
WBC # BLD AUTO: 3.5 K/UL (ref 4.3–11.1)

## 2021-01-11 PROCEDURE — 90471 IMMUNIZATION ADMIN: CPT

## 2021-01-11 PROCEDURE — 74011250636 HC RX REV CODE- 250/636: Performed by: INTERNAL MEDICINE

## 2021-01-11 PROCEDURE — 83605 ASSAY OF LACTIC ACID: CPT

## 2021-01-11 PROCEDURE — 74011000250 HC RX REV CODE- 250: Performed by: INTERNAL MEDICINE

## 2021-01-11 PROCEDURE — 90686 IIV4 VACC NO PRSV 0.5 ML IM: CPT | Performed by: INTERNAL MEDICINE

## 2021-01-11 PROCEDURE — 71260 CT THORAX DX C+: CPT

## 2021-01-11 PROCEDURE — 36415 COLL VENOUS BLD VENIPUNCTURE: CPT

## 2021-01-11 PROCEDURE — 74011250636 HC RX REV CODE- 250/636: Performed by: HOSPITALIST

## 2021-01-11 PROCEDURE — 74011000636 HC RX REV CODE- 636: Performed by: INTERNAL MEDICINE

## 2021-01-11 PROCEDURE — 85025 COMPLETE CBC W/AUTO DIFF WBC: CPT

## 2021-01-11 PROCEDURE — 80053 COMPREHEN METABOLIC PANEL: CPT

## 2021-01-11 PROCEDURE — 74011000258 HC RX REV CODE- 258: Performed by: INTERNAL MEDICINE

## 2021-01-11 PROCEDURE — 74011250637 HC RX REV CODE- 250/637: Performed by: INTERNAL MEDICINE

## 2021-01-11 RX ORDER — LORAZEPAM 0.5 MG/1
1 TABLET ORAL ONCE
Status: COMPLETED | OUTPATIENT
Start: 2021-01-11 | End: 2021-01-11

## 2021-01-11 RX ORDER — SODIUM CHLORIDE 0.9 % (FLUSH) 0.9 %
10 SYRINGE (ML) INJECTION
Status: COMPLETED | OUTPATIENT
Start: 2021-01-11 | End: 2021-01-11

## 2021-01-11 RX ADMIN — GABAPENTIN 300 MG: 300 CAPSULE ORAL at 08:12

## 2021-01-11 RX ADMIN — SODIUM CHLORIDE 100 ML/HR: 900 INJECTION, SOLUTION INTRAVENOUS at 07:41

## 2021-01-11 RX ADMIN — INFLUENZA VIRUS VACCINE 0.5 ML: 15; 15; 15; 15 SUSPENSION INTRAMUSCULAR at 18:13

## 2021-01-11 RX ADMIN — AZITHROMYCIN MONOHYDRATE 500 MG: 500 INJECTION, POWDER, LYOPHILIZED, FOR SOLUTION INTRAVENOUS at 16:58

## 2021-01-11 RX ADMIN — SODIUM CHLORIDE 100 ML: 900 INJECTION, SOLUTION INTRAVENOUS at 12:10

## 2021-01-11 RX ADMIN — Medication 10 ML: at 06:08

## 2021-01-11 RX ADMIN — Medication 10 ML: at 12:10

## 2021-01-11 RX ADMIN — IOPAMIDOL 100 ML: 755 INJECTION, SOLUTION INTRAVENOUS at 12:10

## 2021-01-11 RX ADMIN — CHLORDIAZEPOXIDE HYDROCHLORIDE 10 MG: 10 CAPSULE ORAL at 16:59

## 2021-01-11 RX ADMIN — Medication 10 ML: at 15:17

## 2021-01-11 RX ADMIN — CHLORDIAZEPOXIDE HYDROCHLORIDE 10 MG: 10 CAPSULE ORAL at 08:11

## 2021-01-11 RX ADMIN — QUETIAPINE FUMARATE 50 MG: 25 TABLET ORAL at 08:11

## 2021-01-11 RX ADMIN — ENOXAPARIN SODIUM 30 MG: 30 INJECTION SUBCUTANEOUS at 08:11

## 2021-01-11 RX ADMIN — FOLIC ACID: 5 INJECTION, SOLUTION INTRAMUSCULAR; INTRAVENOUS; SUBCUTANEOUS at 16:58

## 2021-01-11 RX ADMIN — CEFTRIAXONE SODIUM 1 G: 1 INJECTION, POWDER, FOR SOLUTION INTRAMUSCULAR; INTRAVENOUS at 11:21

## 2021-01-11 RX ADMIN — GABAPENTIN 300 MG: 300 CAPSULE ORAL at 16:59

## 2021-01-11 RX ADMIN — LORAZEPAM 1 MG: 0.5 TABLET ORAL at 11:21

## 2021-01-11 RX ADMIN — Medication 220 MG: at 08:11

## 2021-01-11 RX ADMIN — ACETAMINOPHEN 650 MG: 325 TABLET, FILM COATED ORAL at 11:29

## 2021-01-11 RX ADMIN — OXYCODONE HYDROCHLORIDE AND ACETAMINOPHEN 500 MG: 500 TABLET ORAL at 08:12

## 2021-01-11 RX ADMIN — OXYCODONE HYDROCHLORIDE AND ACETAMINOPHEN 500 MG: 500 TABLET ORAL at 16:59

## 2021-01-11 RX ADMIN — QUETIAPINE FUMARATE 50 MG: 25 TABLET ORAL at 17:00

## 2021-01-11 RX ADMIN — ACETAMINOPHEN 650 MG: 325 TABLET, FILM COATED ORAL at 04:01

## 2021-01-11 NOTE — PROGRESS NOTES
Attempted to contact pt via room and cell phone  with no answer, no v/m set up. Also attempted to call primary contact friend, Trey Blair, no answer, v/m left. From chart review pt lives at home with friend, Trey Blair. Pt has had voluntary commitment to [facility] in past for substance abuse. Will continue to try to reach pt. CM to continue to follow and monitor for any further needs.

## 2021-01-11 NOTE — DISCHARGE INSTRUCTIONS
Advance Care Planning  People with COVID-19 may have no symptoms, mild symptoms, such as fever, cough, and shortness of breath or they may have more severe illness, developing severe and fatal pneumonia. As a result, Advance Care Planning with attention to naming a health care decision maker (someone you trust to make healthcare decisions for you if you could not speak for yourself) and sharing other health care preferences is important BEFORE a possible health crisis. Please contact your Primary Care Provider to discuss Advance Care Planning. Preventing the Spread of Coronavirus Disease 2019 in Homes and Residential Communities  For the most recent information go to [email or web address]    Prevention steps for People with confirmed or suspected COVID-19 (including persons under investigation) who do not need to be hospitalized  and   People with confirmed COVID-19 who were hospitalized and determined to be medically stable to go home    Your healthcare provider and public health staff will evaluate whether you can be cared for at home. If it is determined that you do not need to be hospitalized and can be isolated at home, you will be monitored by staff from your local or state health department. You should follow the prevention steps below until a healthcare provider or local or state health department says you can return to your normal activities. Stay home except to get medical care  People who are mildly ill with COVID-19 are able to isolate at home during their illness. You should restrict activities outside your home, except for getting medical care. Do not go to work, school, or public areas. Avoid using public transportation, ride-sharing, or taxis. Separate yourself from other people and animals in your home  People: As much as possible, you should stay in a specific room and away from other people in your home.  Also, you should use a separate bathroom, if available. Animals: You should restrict contact with pets and other animals while you are sick with COVID-19, just like you would around other people. Although there have not been reports of pets or other animals becoming sick with COVID-19, it is still recommended that people sick with COVID-19 limit contact with animals until more information is known about the virus. When possible, have another member of your household care for your animals while you are sick. If you are sick with COVID-19, avoid contact with your pet, including petting, snuggling, being kissed or licked, and sharing food. If you must care for your pet or be around animals while you are sick, wash your hands before and after you interact with pets and wear a facemask. Call ahead before visiting your doctor  If you have a medical appointment, call the healthcare provider and tell them that you have or may have COVID-19. This will help the healthcare providers office take steps to keep other people from getting infected or exposed. Wear a facemask  You should wear a facemask when you are around other people (e.g., sharing a room or vehicle) or pets and before you enter a healthcare providers office. If you are not able to wear a facemask (for example, because it causes trouble breathing), then people who live with you should not stay in the same room with you, or they should wear a facemask if they enter your room. Cover your coughs and sneezes  Cover your mouth and nose with a tissue when you cough or sneeze. Throw used tissues in a lined trash can. Immediately wash your hands with soap and water for at least 20 seconds or, if soap and water are not available, clean your hands with an alcohol-based hand  that contains at least 60% alcohol.   Clean your hands often  Wash your hands often with soap and water for at least 20 seconds, especially after blowing your nose, coughing, or sneezing; going to the bathroom; and before eating or preparing food. If soap and water are not readily available, use an alcohol-based hand  with at least 60% alcohol, covering all surfaces of your hands and rubbing them together until they feel dry. Soap and water are the best option if hands are visibly dirty. Avoid touching your eyes, nose, and mouth with unwashed hands. Avoid sharing personal household items  You should not share dishes, drinking glasses, cups, eating utensils, towels, or bedding with other people or pets in your home. After using these items, they should be washed thoroughly with soap and water. Clean all high-touch surfaces everyday  High touch surfaces include counters, tabletops, doorknobs, bathroom fixtures, toilets, phones, keyboards, tablets, and bedside tables. Also, clean any surfaces that may have blood, stool, or body fluids on them. Use a household cleaning spray or wipe, according to the label instructions. Labels contain instructions for safe and effective use of the cleaning product including precautions you should take when applying the product, such as wearing gloves and making sure you have good ventilation during use of the product. Monitor your symptoms  Seek prompt medical attention if your illness is worsening (e.g., difficulty breathing). Before seeking care, call your healthcare provider and tell them that you have, or are being evaluated for, COVID-19. Put on a facemask before you enter the facility. These steps will help the healthcare providers office to keep other people in the office or waiting room from getting infected or exposed. Ask your healthcare provider to call the local or state health department. Persons who are placed under active monitoring or facilitated self-monitoring should follow instructions provided by their local health department or occupational health professionals, as appropriate. When working with your local health department check their available hours.   If you have a medical emergency and need to call 911, notify the dispatch personnel that you have, or are being evaluated for COVID-19. If possible, put on a facemask before emergency medical services arrive. Discontinuing home isolation  Patients with confirmed COVID-19 should remain under home isolation precautions until the risk of secondary transmission to others is thought to be low. The decision to discontinue home isolation precautions should be made on a case-by-case basis, in consultation with healthcare providers and state and local health departments. DISCHARGE SUMMARY from Nurse    PATIENT INSTRUCTIONS:    After general anesthesia or intravenous sedation, for 24 hours or while taking prescription Narcotics:  · Limit your activities  · Do not drive and operate hazardous machinery  · Do not make important personal or business decisions  · Do  not drink alcoholic beverages  · If you have not urinated within 8 hours after discharge, please contact your surgeon on call. Report the following to your surgeon:  · Excessive pain, swelling, redness or odor of or around the surgical area  · Temperature over 100.5  · Nausea and vomiting lasting longer than 4 hours or if unable to take medications  · Any signs of decreased circulation or nerve impairment to extremity: change in color, persistent  numbness, tingling, coldness or increase pain  · Any questions    What to do at Home:  Recommended activity: Activity as tolerated and no driving for today. If you experience any of the following symptoms fever or 101 or greater, increased shortness of breath, nausea or vomiting uncontrolled by medication, please follow up with PCP. *  Please give a list of your current medications to your Primary Care Provider. *  Please update this list whenever your medications are discontinued, doses are      changed, or new medications (including over-the-counter products) are added.     *  Please carry medication information at all times in case of emergency situations.    These are general instructions for a healthy lifestyle:    No smoking/ No tobacco products/ Avoid exposure to second hand smoke  Surgeon General's Warning:  Quitting smoking now greatly reduces serious risk to your health.    Obesity, smoking, and sedentary lifestyle greatly increases your risk for illness    A healthy diet, regular physical exercise & weight monitoring are important for maintaining a healthy lifestyle    You may be retaining fluid if you have a history of heart failure or if you experience any of the following symptoms:  Weight gain of 3 pounds or more overnight or 5 pounds in a week, increased swelling in our hands or feet or shortness of breath while lying flat in bed.  Please call your doctor as soon as you notice any of these symptoms; do not wait until your next office visit.        The discharge information has been reviewed with the patient.  The patient verbalized understanding.  Discharge medications reviewed with the patient and appropriate educational materials and side effects teaching were provided.  ___________________________________________________________________________________________________________________________________    Patient Education        10 Things to Do When You Have COVID-19    Stay home.  Don't go to school, work, or public areas. And don't use public transportation, ride-shares, or taxis unless you have no choice. Leave your home only if you need to get medical care. But call the doctor's office first so they know you're coming. And wear a cloth face cover.     Ask before leaving isolation.  Talk with your doctor or other health professional about when it will be safe for you to leave isolation.     Wear a cloth face cover when you are around other people.  It can help stop the spread of the virus when you cough or sneeze.     Limit contact with people in your home.  If possible, stay in a separate bedroom and use  a separate bathroom. Avoid contact with pets and other animals. If possible, have a friend or family member care for them while you're sick. Cover your mouth and nose with a tissue when you cough or sneeze. Then throw the tissue in the trash right away. Wash your hands often, especially after you cough or sneeze. Use soap and water, and scrub for at least 20 seconds. If soap and water aren't available, use an alcohol-based hand . Don't share personal household items. These include bedding, towels, cups and glasses, and eating utensils. Clean and disinfect your home every day. Use household  or disinfectant wipes or sprays. Take special care to clean things that you grab with your hands. These include doorknobs, remote controls, phones, and handles on your refrigerator and microwave. And don't forget countertops, tabletops, bathrooms, and computer keyboards. Take acetaminophen (Tylenol) to relieve fever and body aches. Read and follow all instructions on the label. Current as of: July 10, 2020               Content Version: 12.6  © 0210-5257 [employer]. Care instructions adapted under license by [employer] (which disclaims liability or warranty for this information). If you have questions about a medical condition or this instruction, always ask your healthcare professional. [address] any warranty or liability for your use of this information.

## 2021-01-11 NOTE — DISCHARGE SUMMARY
Date of Admission: 1/10/2021  Date of Discharge: 1/11/2021    Discharge Diagnoses:  1. Alcohol withdrawal  2. Covid infection    Discharge Medications:  Current Discharge Medication List      CONTINUE these medications which have NOT CHANGED    Details   ondansetron (ZOFRAN ODT) 8 mg disintegrating tablet Take 1 Tab by mouth every eight (8) hours as needed for Nausea. Qty: 12 Tab, Refills: 0      QUEtiapine (SEROqueL) 50 mg tablet Take 50 mg by mouth three (3) times daily. Indications: repeated episodes of anxiety      mirtazapine (Remeron) 45 mg tablet Take 45 mg by mouth nightly.      gabapentin (NEURONTIN) 300 mg capsule Take 300 mg by mouth three (3) times daily. Indications: alcoholism      traZODone (DESYREL) 150 mg tablet Take 150 mg by mouth nightly. Indications: PT. NEEDS NEW PRESCRIPTION AT D/C. Pending Labs:  None    Follow-up (including scheduled tests): Follow-up Information     Follow up With Specialties Details Why Contact Info    Other, Gaston, MD    Patient can only remember the practice name and not the physician             History of Present Illness:  37 yo male with PMH of daily ETOH use, seizures,  COVID 23 diagnosed 1-8-21 who is evaluated with complaints of nausea, emesis and found with metabolic acidosis. He denies abdominal pain or diarrhea. He has felt progressively worse with COVID. He admits to daily ETOH use and has had prior DTs. Past Medical History:  Past Medical History:   Diagnosis Date    Ill-defined condition     ETOH    Psychiatric disorder     anxiety/depression       Allergies:  No Known Allergies    Hospital Course:  37 yo male with PMH of daily ETOH use, seizures,  COVID 23 diagnosed 1-8-21 who is evaluated with complaints of nausea, emesis and found with metabolic acidosis. He denies abdominal pain or diarrhea. He has felt progressively worse with COVID.  He admits to daily ETOH use and has had prior DTs.    1. Covid infection  - No radiologic findings of pneumonia or PE  - No hypoxia during hospitalization  - Nausea and vomiting resolved  - Hemodynamically stable on discharge    2. Alcohol withdrawal  - Started on librium  - No withdrawal symptoms on discharge  - Patient requested discharge   - Outpatient alcohol rehab     3.   Seizures  - Stated only taking lyrica  - Stated hasn't had a seizure in 1 year    Procedures:  None    Discharge Day Information:  Follow with PMD    Discharge Physical Exam:  General: Alert, oriented, NAD  HEENT: NC/AT, EOM are intact  Neck: supple, no JVD  Cardiovascular: RRR, S1, S2, no murmurs  Respiratory: Lungs are clear, no wheezes or rales  Abdomen: Soft, NT, ND  Back: No CVA tenderness, no paraspinal tenderness  Extremities: LE without pedal edema, no erythema  Neuro: A&O, CN are intact, no focal deficits  Skin: no rash or ulcers  Psych: good mood and affect    Condition: Improved    Disposition: Home    Consultants During This Hospitalization: None

## 2021-01-11 NOTE — PROGRESS NOTES
Problem: Falls - Risk of  Goal: *Absence of Falls  Description: Document George Cage Fall Risk and appropriate interventions in the flowsheet.   Outcome: Progressing Towards Goal  Note: Fall Risk Interventions:            Medication Interventions: Patient to call before getting OOB, Teach patient to arise slowly                   Problem: Patient Education: Go to Patient Education Activity  Goal: Patient/Family Education  Outcome: Progressing Towards Goal

## 2021-01-12 NOTE — PROGRESS NOTES
D/C instructions reviewed with pt. Pt verbalized understanding. Signed copy placed in chart. PIV removed.

## 2021-01-12 NOTE — PROGRESS NOTES
1/12/2021 0915: Contacted pt via phone as pt had late d/c on 1/11/2021. Pt report no needs and verified PCP as Dr. Christine Lorenzo. Pt aware to f/u with PCP. Care Management Interventions  PCP Verified by CM: Yes(Dr. Christine Lorenzo)  Mode of Transport at Discharge:  Other (see comment)(friend)  Transition of Care Consult (CM Consult): Discharge Planning  Discharge Durable Medical Equipment: No  Physical Therapy Consult: No  Occupational Therapy Consult: No  Speech Therapy Consult: No  Current Support Network: Own Home  Confirm Follow Up Transport: Friends  The Plan for Transition of Care is Related to the Following Treatment Goals : Retrun to baseline   Discharge Location  Discharge Placement: Home

## 2021-01-13 LAB
BLD PROD TYP BPU: NORMAL
BPU ID: NORMAL
STATUS OF UNIT,%ST: NORMAL
UNIT DIVISION, %UDIV: NORMAL

## 2021-01-15 LAB
BACTERIA SPEC CULT: NORMAL
BACTERIA SPEC CULT: NORMAL
SERVICE CMNT-IMP: NORMAL
SERVICE CMNT-IMP: NORMAL

## 2021-02-05 VITALS
RESPIRATION RATE: 18 BRPM | DIASTOLIC BLOOD PRESSURE: 83 MMHG | TEMPERATURE: 99.1 F | HEART RATE: 107 BPM | OXYGEN SATURATION: 99 % | SYSTOLIC BLOOD PRESSURE: 131 MMHG | WEIGHT: 140 LBS | HEIGHT: 72 IN | BODY MASS INDEX: 18.96 KG/M2

## 2021-02-05 PROCEDURE — 75810000275 HC EMERGENCY DEPT VISIT NO LEVEL OF CARE

## 2021-02-05 RX ORDER — LORAZEPAM 0.5 MG/1
0.5 TABLET ORAL
COMMUNITY
End: 2021-07-27

## 2021-02-06 ENCOUNTER — HOSPITAL ENCOUNTER (EMERGENCY)
Age: 41
Discharge: LEFT BEFORE DISCHARGE | End: 2021-02-06
Attending: EMERGENCY MEDICINE
Payer: COMMERCIAL

## 2021-02-06 NOTE — ED TRIAGE NOTES
Pt in via EMS from home with complaints of ETOH use X4 days and has not taken his anxiety and depression meds. Pt states he wants to hurt himself, but denies SI and denies having a plan to kill himself.  Pt masked on arrival.

## 2021-07-26 ENCOUNTER — HOSPITAL ENCOUNTER (INPATIENT)
Age: 41
LOS: 1 days | Discharge: HOME | DRG: 897 | End: 2021-07-27
Attending: EMERGENCY MEDICINE | Admitting: INTERNAL MEDICINE

## 2021-07-26 DIAGNOSIS — F10.932: Primary | ICD-10-CM

## 2021-07-26 LAB
ABO + RH BLD: NORMAL
ALBUMIN SERPL-MCNC: 4.5 G/DL (ref 3.5–5)
ALBUMIN/GLOB SERPL: 1.2 {RATIO} (ref 1.2–3.5)
ALP SERPL-CCNC: 55 U/L (ref 50–136)
ALT SERPL-CCNC: 49 U/L (ref 12–65)
ANION GAP SERPL CALC-SCNC: 23 MMOL/L (ref 7–16)
APPEARANCE UR: CLEAR
ARTERIAL PATENCY WRIST A: POSITIVE
AST SERPL-CCNC: 75 U/L (ref 15–37)
ATRIAL RATE: 85 BPM
BACTERIA URNS QL MICRO: 0 /HPF
BASE DEFICIT BLD-SCNC: 4 MMOL/L
BASOPHILS # BLD: 0.1 K/UL (ref 0–0.2)
BASOPHILS NFR BLD: 1 % (ref 0–2)
BDY SITE: ABNORMAL
BILIRUB SERPL-MCNC: 0.7 MG/DL (ref 0.2–1.1)
BILIRUB UR QL: NEGATIVE
BLOOD GROUP ANTIBODIES SERPL: NORMAL
BUN SERPL-MCNC: 11 MG/DL (ref 6–23)
CALCIUM SERPL-MCNC: 8.5 MG/DL (ref 8.3–10.4)
CALCULATED P AXIS, ECG09: 46 DEGREES
CALCULATED R AXIS, ECG10: 33 DEGREES
CALCULATED T AXIS, ECG11: 67 DEGREES
CHLORIDE SERPL-SCNC: 96 MMOL/L (ref 98–107)
CO2 SERPL-SCNC: 21 MMOL/L (ref 21–32)
COLOR UR: YELLOW
CREAT SERPL-MCNC: 0.76 MG/DL (ref 0.8–1.5)
DIAGNOSIS, 93000: NORMAL
DIFFERENTIAL METHOD BLD: ABNORMAL
EOSINOPHIL # BLD: 0.1 K/UL (ref 0–0.8)
EOSINOPHIL NFR BLD: 1 % (ref 0.5–7.8)
EPI CELLS #/AREA URNS HPF: ABNORMAL /HPF
ERYTHROCYTE [DISTWIDTH] IN BLOOD BY AUTOMATED COUNT: 12.5 % (ref 11.9–14.6)
ETHANOL SERPL-MCNC: 263 MG/DL
GLOBULIN SER CALC-MCNC: 3.7 G/DL (ref 2.3–3.5)
GLUCOSE SERPL-MCNC: 98 MG/DL (ref 65–100)
GLUCOSE UR STRIP.AUTO-MCNC: NEGATIVE MG/DL
HCO3 BLD-SCNC: 18.6 MMOL/L (ref 22–26)
HCT VFR BLD AUTO: 42.5 % (ref 41.1–50.3)
HCT VFR BLD AUTO: 50 % (ref 41.1–50.3)
HGB BLD-MCNC: 14.8 G/DL (ref 13.6–17.2)
HGB BLD-MCNC: 17.4 G/DL (ref 13.6–17.2)
HGB UR QL STRIP: ABNORMAL
IMM GRANULOCYTES # BLD AUTO: 0 K/UL (ref 0–0.5)
IMM GRANULOCYTES NFR BLD AUTO: 0 % (ref 0–5)
KETONES UR QL STRIP.AUTO: 80 MG/DL
LEUKOCYTE ESTERASE UR QL STRIP.AUTO: NEGATIVE
LIPASE SERPL-CCNC: 59 U/L (ref 73–393)
LYMPHOCYTES # BLD: 1.4 K/UL (ref 0.5–4.6)
LYMPHOCYTES NFR BLD: 18 % (ref 13–44)
MAGNESIUM SERPL-MCNC: 2.3 MG/DL (ref 1.8–2.4)
MCH RBC QN AUTO: 32.1 PG (ref 26.1–32.9)
MCHC RBC AUTO-ENTMCNC: 34.8 G/DL (ref 31.4–35)
MCV RBC AUTO: 92.3 FL (ref 79.6–97.8)
MONOCYTES # BLD: 0.6 K/UL (ref 0.1–1.3)
MONOCYTES NFR BLD: 8 % (ref 4–12)
NEUTS SEG # BLD: 5.5 K/UL (ref 1.7–8.2)
NEUTS SEG NFR BLD: 72 % (ref 43–78)
NITRITE UR QL STRIP.AUTO: NEGATIVE
NRBC # BLD: 0 K/UL (ref 0–0.2)
O2/TOTAL GAS SETTING VFR VENT: 21 %
P-R INTERVAL, ECG05: 120 MS
PCO2 BLD: 27 MMHG (ref 35–45)
PH BLD: 7.45 [PH] (ref 7.35–7.45)
PH UR STRIP: 5.5 [PH] (ref 5–9)
PLATELET # BLD AUTO: 183 K/UL (ref 150–450)
PMV BLD AUTO: 9 FL (ref 9.4–12.3)
PO2 BLD: 81 MMHG (ref 75–100)
POTASSIUM SERPL-SCNC: 3.5 MMOL/L (ref 3.5–5.1)
PROT SERPL-MCNC: 8.2 G/DL (ref 6.3–8.2)
PROT UR STRIP-MCNC: 100 MG/DL
Q-T INTERVAL, ECG07: 410 MS
QRS DURATION, ECG06: 96 MS
QTC CALCULATION (BEZET), ECG08: 487 MS
RBC # BLD AUTO: 5.42 M/UL (ref 4.23–5.6)
RBC #/AREA URNS HPF: ABNORMAL /HPF
SAO2 % BLD: 96.6 % (ref 95–98)
SERVICE CMNT-IMP: ABNORMAL
SODIUM SERPL-SCNC: 140 MMOL/L (ref 136–145)
SP GR UR REFRACTOMETRY: 1.02 (ref 1–1.02)
SPECIMEN EXP DATE BLD: NORMAL
SPECIMEN TYPE: ABNORMAL
UROBILINOGEN UR QL STRIP.AUTO: 0.2 EU/DL (ref 0.2–1)
VENTRICULAR RATE, ECG03: 85 BPM
WBC # BLD AUTO: 7.6 K/UL (ref 4.3–11.1)
WBC URNS QL MICRO: ABNORMAL /HPF

## 2021-07-26 PROCEDURE — 85018 HEMOGLOBIN: CPT

## 2021-07-26 PROCEDURE — 74011000250 HC RX REV CODE- 250: Performed by: INTERNAL MEDICINE

## 2021-07-26 PROCEDURE — 74011250637 HC RX REV CODE- 250/637: Performed by: INTERNAL MEDICINE

## 2021-07-26 PROCEDURE — 96374 THER/PROPH/DIAG INJ IV PUSH: CPT

## 2021-07-26 PROCEDURE — 83735 ASSAY OF MAGNESIUM: CPT

## 2021-07-26 PROCEDURE — 80053 COMPREHEN METABOLIC PANEL: CPT

## 2021-07-26 PROCEDURE — 82803 BLOOD GASES ANY COMBINATION: CPT

## 2021-07-26 PROCEDURE — 36600 WITHDRAWAL OF ARTERIAL BLOOD: CPT

## 2021-07-26 PROCEDURE — 94761 N-INVAS EAR/PLS OXIMETRY MLT: CPT

## 2021-07-26 PROCEDURE — C9113 INJ PANTOPRAZOLE SODIUM, VIA: HCPCS | Performed by: INTERNAL MEDICINE

## 2021-07-26 PROCEDURE — 99284 EMERGENCY DEPT VISIT MOD MDM: CPT

## 2021-07-26 PROCEDURE — 2709999900 HC NON-CHARGEABLE SUPPLY

## 2021-07-26 PROCEDURE — 74011250636 HC RX REV CODE- 250/636: Performed by: INTERNAL MEDICINE

## 2021-07-26 PROCEDURE — 81003 URINALYSIS AUTO W/O SCOPE: CPT

## 2021-07-26 PROCEDURE — 85025 COMPLETE CBC W/AUTO DIFF WBC: CPT

## 2021-07-26 PROCEDURE — 82077 ASSAY SPEC XCP UR&BREATH IA: CPT

## 2021-07-26 PROCEDURE — 83690 ASSAY OF LIPASE: CPT

## 2021-07-26 PROCEDURE — 36415 COLL VENOUS BLD VENIPUNCTURE: CPT

## 2021-07-26 PROCEDURE — 65270000029 HC RM PRIVATE

## 2021-07-26 PROCEDURE — 74011250636 HC RX REV CODE- 250/636: Performed by: EMERGENCY MEDICINE

## 2021-07-26 PROCEDURE — 96375 TX/PRO/DX INJ NEW DRUG ADDON: CPT

## 2021-07-26 PROCEDURE — 96376 TX/PRO/DX INJ SAME DRUG ADON: CPT

## 2021-07-26 PROCEDURE — 86901 BLOOD TYPING SEROLOGIC RH(D): CPT

## 2021-07-26 PROCEDURE — 93005 ELECTROCARDIOGRAM TRACING: CPT | Performed by: INTERNAL MEDICINE

## 2021-07-26 RX ORDER — GABAPENTIN 300 MG/1
300 CAPSULE ORAL
Status: DISCONTINUED | OUTPATIENT
Start: 2021-07-26 | End: 2021-07-26

## 2021-07-26 RX ORDER — QUETIAPINE FUMARATE 25 MG/1
50 TABLET, FILM COATED ORAL 3 TIMES DAILY
Status: DISCONTINUED | OUTPATIENT
Start: 2021-07-26 | End: 2021-07-26 | Stop reason: DRUGHIGH

## 2021-07-26 RX ORDER — ONDANSETRON 2 MG/ML
4 INJECTION INTRAMUSCULAR; INTRAVENOUS
Status: COMPLETED | OUTPATIENT
Start: 2021-07-26 | End: 2021-07-26

## 2021-07-26 RX ORDER — SODIUM CHLORIDE 0.9 % (FLUSH) 0.9 %
5-40 SYRINGE (ML) INJECTION EVERY 8 HOURS
Status: DISCONTINUED | OUTPATIENT
Start: 2021-07-26 | End: 2021-07-27 | Stop reason: HOSPADM

## 2021-07-26 RX ORDER — SODIUM CHLORIDE 0.9 % (FLUSH) 0.9 %
5-40 SYRINGE (ML) INJECTION AS NEEDED
Status: DISCONTINUED | OUTPATIENT
Start: 2021-07-26 | End: 2021-07-27 | Stop reason: HOSPADM

## 2021-07-26 RX ORDER — ONDANSETRON 2 MG/ML
4 INJECTION INTRAMUSCULAR; INTRAVENOUS
Status: DISCONTINUED | OUTPATIENT
Start: 2021-07-26 | End: 2021-07-27 | Stop reason: HOSPADM

## 2021-07-26 RX ORDER — GABAPENTIN 300 MG/1
300 CAPSULE ORAL 3 TIMES DAILY
Status: DISCONTINUED | OUTPATIENT
Start: 2021-07-26 | End: 2021-07-27 | Stop reason: HOSPADM

## 2021-07-26 RX ORDER — SODIUM CHLORIDE 9 MG/ML
1000 INJECTION, SOLUTION INTRAVENOUS ONCE
Status: COMPLETED | OUTPATIENT
Start: 2021-07-26 | End: 2021-07-26

## 2021-07-26 RX ORDER — LORAZEPAM 1 MG/1
3-4 TABLET ORAL
Status: ACTIVE | OUTPATIENT
Start: 2021-07-26 | End: 2021-07-26

## 2021-07-26 RX ORDER — MAG HYDROX/ALUMINUM HYD/SIMETH 200-200-20
30 SUSPENSION, ORAL (FINAL DOSE FORM) ORAL
Status: DISCONTINUED | OUTPATIENT
Start: 2021-07-26 | End: 2021-07-27 | Stop reason: HOSPADM

## 2021-07-26 RX ORDER — SODIUM CHLORIDE 9 MG/ML
150 INJECTION, SOLUTION INTRAVENOUS ONCE
Status: COMPLETED | OUTPATIENT
Start: 2021-07-26 | End: 2021-07-26

## 2021-07-26 RX ORDER — ONDANSETRON 2 MG/ML
INJECTION INTRAMUSCULAR; INTRAVENOUS
Status: ACTIVE
Start: 2021-07-26 | End: 2021-07-26

## 2021-07-26 RX ORDER — MIRTAZAPINE 15 MG/1
45 TABLET, FILM COATED ORAL
Status: DISCONTINUED | OUTPATIENT
Start: 2021-07-26 | End: 2021-07-27 | Stop reason: HOSPADM

## 2021-07-26 RX ORDER — LORAZEPAM 2 MG/ML
1 INJECTION INTRAMUSCULAR
Status: DISCONTINUED | OUTPATIENT
Start: 2021-07-26 | End: 2021-07-26

## 2021-07-26 RX ORDER — PROMETHAZINE HYDROCHLORIDE 25 MG/1
25 SUPPOSITORY RECTAL
Status: DISCONTINUED | OUTPATIENT
Start: 2021-07-26 | End: 2021-07-27 | Stop reason: HOSPADM

## 2021-07-26 RX ORDER — LORAZEPAM 2 MG/ML
1 INJECTION INTRAMUSCULAR
Status: COMPLETED | OUTPATIENT
Start: 2021-07-26 | End: 2021-07-26

## 2021-07-26 RX ORDER — SODIUM CHLORIDE 9 MG/ML
75 INJECTION, SOLUTION INTRAVENOUS CONTINUOUS
Status: DISCONTINUED | OUTPATIENT
Start: 2021-07-26 | End: 2021-07-27 | Stop reason: HOSPADM

## 2021-07-26 RX ORDER — QUETIAPINE FUMARATE 100 MG/1
100 TABLET, FILM COATED ORAL 3 TIMES DAILY
Status: DISCONTINUED | OUTPATIENT
Start: 2021-07-26 | End: 2021-07-27 | Stop reason: HOSPADM

## 2021-07-26 RX ORDER — LORAZEPAM 2 MG/ML
1 INJECTION INTRAMUSCULAR
Status: DISCONTINUED | OUTPATIENT
Start: 2021-07-26 | End: 2021-07-27 | Stop reason: HOSPADM

## 2021-07-26 RX ORDER — SODIUM CHLORIDE 9 MG/ML
250 INJECTION, SOLUTION INTRAVENOUS AS NEEDED
Status: DISCONTINUED | OUTPATIENT
Start: 2021-07-26 | End: 2021-07-27 | Stop reason: HOSPADM

## 2021-07-26 RX ORDER — SODIUM CHLORIDE 9 MG/ML
100 INJECTION, SOLUTION INTRAVENOUS CONTINUOUS
Status: DISCONTINUED | OUTPATIENT
Start: 2021-07-26 | End: 2021-07-26

## 2021-07-26 RX ADMIN — LORAZEPAM 1 MG: 2 INJECTION INTRAMUSCULAR; INTRAVENOUS at 15:09

## 2021-07-26 RX ADMIN — MIRTAZAPINE 45 MG: 15 TABLET, FILM COATED ORAL at 22:15

## 2021-07-26 RX ADMIN — ONDANSETRON 4 MG: 2 INJECTION INTRAMUSCULAR; INTRAVENOUS at 10:54

## 2021-07-26 RX ADMIN — QUETIAPINE FUMARATE 100 MG: 100 TABLET ORAL at 15:10

## 2021-07-26 RX ADMIN — GABAPENTIN 300 MG: 300 CAPSULE ORAL at 22:15

## 2021-07-26 RX ADMIN — SODIUM CHLORIDE 1000 ML: 900 INJECTION, SOLUTION INTRAVENOUS at 05:27

## 2021-07-26 RX ADMIN — SODIUM CHLORIDE 75 ML/HR: 900 INJECTION, SOLUTION INTRAVENOUS at 22:16

## 2021-07-26 RX ADMIN — SODIUM CHLORIDE 40 MG: 9 INJECTION, SOLUTION INTRAMUSCULAR; INTRAVENOUS; SUBCUTANEOUS at 11:32

## 2021-07-26 RX ADMIN — LORAZEPAM 1 MG: 2 INJECTION INTRAMUSCULAR; INTRAVENOUS at 18:31

## 2021-07-26 RX ADMIN — ONDANSETRON 4 MG: 2 INJECTION INTRAMUSCULAR; INTRAVENOUS at 03:11

## 2021-07-26 RX ADMIN — LORAZEPAM 1 MG: 2 INJECTION INTRAMUSCULAR; INTRAVENOUS at 03:53

## 2021-07-26 RX ADMIN — LORAZEPAM 1 MG: 2 INJECTION INTRAMUSCULAR; INTRAVENOUS at 11:03

## 2021-07-26 RX ADMIN — SODIUM CHLORIDE 40 MG: 9 INJECTION, SOLUTION INTRAMUSCULAR; INTRAVENOUS; SUBCUTANEOUS at 22:13

## 2021-07-26 RX ADMIN — SODIUM CHLORIDE 150 ML/HR: 900 INJECTION, SOLUTION INTRAVENOUS at 04:38

## 2021-07-26 RX ADMIN — PROMETHAZINE HYDROCHLORIDE 25 MG: 25 SUPPOSITORY RECTAL at 12:01

## 2021-07-26 RX ADMIN — LORAZEPAM 1 MG: 2 INJECTION INTRAMUSCULAR; INTRAVENOUS at 09:04

## 2021-07-26 RX ADMIN — SODIUM CHLORIDE 1000 ML: 900 INJECTION, SOLUTION INTRAVENOUS at 03:11

## 2021-07-26 RX ADMIN — QUETIAPINE FUMARATE 100 MG: 100 TABLET ORAL at 22:15

## 2021-07-26 RX ADMIN — GABAPENTIN 300 MG: 300 CAPSULE ORAL at 15:10

## 2021-07-26 RX ADMIN — FOLIC ACID: 5 INJECTION, SOLUTION INTRAMUSCULAR; INTRAVENOUS; SUBCUTANEOUS at 11:32

## 2021-07-26 NOTE — ED NOTES
TRANSFER - OUT REPORT:    Verbal report given to Daniel Bell RN on Yuli   being transferred to [facility] [place] [phone] for routine progression of care       Report consisted of patients Situation, Background, Assessment and   Recommendations(SBAR). Information from the following report(s) SBAR was reviewed with the receiving nurse. Lines:   Peripheral IV 07/26/21 Right Antecubital (Active)   Site Assessment Clean, dry, & intact 07/26/21 0306   Phlebitis Assessment 0 07/26/21 0306   Infiltration Assessment 0 07/26/21 0306   Dressing Status Clean, dry, & intact 07/26/21 0306        Opportunity for questions and clarification was provided.       Patient transported with:   [employer]

## 2021-07-26 NOTE — PROGRESS NOTES
@0038 Meet with pt at the request of staff. Pt here for alcohol withdraws. States that he is a  [place] drinker and has been drinking approx 2-3 bottles of wine since Monday 7/19. He would wake up \"chug\" more wine to fall back asleep and has been repeating this cycle for the past week. Has moments of sobriety, over the past few yrs. Really had a tough time about 5 yrs ago, when found out wife was cheating with her best friend's spouse and his mother passed away form [place].  has been to [facility] in the past for Detox, has spoken to a counselor with [facility] [address], stayed in contact with Mardi Dubin and has been speaking with her through the night. 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 Has a roommate that he meet at [address] [facility], who per the pt is \"still drinking, is Jaundice and with a swollen stomach\". Pt states that he has a supportive girlfriend, an \"ok\" relationship with his father and step-mother. Also sees a counselor at [facility] & is current with them, but does not remember that persons name. Asked pt about plan for d/c and he is wanting/willing to \"get help\". Advised pt to call [facility] DEPEND[facility] and do a phone screen. I also called [facility] for detox, pt is a self pay and would need to pay $6,000 OOP up front. Confirmed with pt that this is not financially possible. @2335 I spoke with [facility] DEPENDENC[facility]" they do not have any beds this am, pt will need to check back in a.m. I have faxed clinicals for them to review for possible admission. Cherelle Syed will call me back after review to confirm if pt is eligible for admission at this time. PT/RN and MD aware. @1000 Meet with pt to provide up dates, pt vomiting and unable to have a conversation, pt tachycardic on monitor, RN aware a MD informed. @1020 PT to be admitted, spoke with Cherelle Syed @ [facility] to inform of pt's admission.

## 2021-07-26 NOTE — PROGRESS NOTES
Admission assessment completed. Patient is alert and oriented x 4, verbalizes needs well. Up with assist for bathroom and uses urinal for urination. Skin warm, dry and intact. CIWA Protocol in place. Denies pain at this time. Has not had nausea/vomiting since arrival to floor.

## 2021-07-26 NOTE — ED PROVIDER NOTES
80-year-old male with history of alcoholism presents with concern for withdrawal and reports he has been binging since Monday and not eating. He began vomiting earlier today and states he vomited blood which he describes as brown material and when asked confirms it look like coffee grounds. He denies any rectal bleeding or black stool. He complains of upper abdominal discomfort with the vomiting. No seizures but he has had them in the past.  No current hallucinations. . Symptoms are worse with vomiting. No radiation of the pain. Pain poorly described. The history is provided by the patient. Alcohol Problem  Primary symptoms include: weakness, agitation, hallucinations and intoxication. There areno loss of consciousness present at this time. This is a chronic problem. The current episode started more than 1 week ago. The problem has been rapidly worsening. Suspected agents include alcohol. Associated symptoms include nausea and vomiting. Pertinent negatives include no fever. Associated medical issues include addiction treatment and withdrawal syndrome. Associated medical issues do not include suicidal ideas. Past Medical History:   Diagnosis Date    Ill-defined condition     ETOH    Psychiatric disorder     anxiety/depression       No past surgical history on file. No family history on file. Social History     Socioeconomic History    Marital status:      Spouse name: Not on file    Number of children: Not on file    Years of education: Not on file    Highest education level: Not on file   Occupational History    Not on file   Tobacco Use    Smoking status: Former Smoker     Quit date: 1/2/2006     Years since quitting: 15.5    Smokeless tobacco: Never Used   Substance and Sexual Activity    Alcohol use: Yes     Alcohol/week: 34.2 standard drinks     Types: 14 Glasses of wine, 6 Cans of beer, 21 Standard drinks or equivalent per week     Comment: daily 2-3     Drug use:  No  Sexual activity: Not on file   Other Topics Concern    Not on file   Social History Narrative    Not on file     Social Determinants of Health     Financial Resource Strain:     Difficulty of Paying Living Expenses:    Food Insecurity:     Worried About Running Out of Food in the Last Year:     [address] in the Last Year:    Transportation Needs:     Lack of Transportation (Medical):  Lack of Transportation (Non-Medical):    Physical Activity:     Days of Exercise per Week:     Minutes of Exercise per Session:    Stress:     Feeling of Stress :    Social Connections:     Frequency of Communication with Friends and Family:     Frequency of Social Gatherings with Friends and Family:     Attends [employer]oration[employer] Services:     Active Member of Clubs or Organizations:     Attends Club or Organization Meetings:     Marital Status:    Intimate Partner Violence:     Fear of Current or Ex-Partner:     Emotionally Abused:     Physically Abused:     Sexually Abused: ALLERGIES: Patient has no known allergies. Review of Systems   Constitutional: Negative for chills and fever. HENT: Negative for congestion, rhinorrhea and sore throat. Eyes: Negative for photophobia and redness. Respiratory: Negative for cough and shortness of breath. Cardiovascular: Negative for chest pain and leg swelling. Gastrointestinal: Positive for nausea and vomiting. Negative for abdominal pain, blood in stool and diarrhea. Endocrine: Negative for polydipsia and polyuria. Genitourinary: Positive for decreased urine volume. Negative for dysuria and frequency. Musculoskeletal: Negative for back pain and myalgias. Neurological: Positive for weakness. Negative for loss of consciousness, numbness and headaches. Psychiatric/Behavioral: Positive for agitation and hallucinations. Negative for suicidal ideas. All other systems reviewed and are negative.       Vitals:    07/26/21 0302   BP: (!) 145/87 Pulse: (!) 102   Resp: 18   Temp: 98.2 °F (36.8 °C)   SpO2: 96%   Weight: 63.5 kg (139 lb 15.9 oz)   Height: 6' (1.829 m)            Physical Exam  Vitals and nursing note reviewed. Constitutional:       General: He is not in acute distress. Appearance: Normal appearance. He is well-developed and normal weight. He is ill-appearing. He is not toxic-appearing or diaphoretic. HENT:      Head: Normocephalic and atraumatic. Mouth/Throat:      Mouth: Mucous membranes are moist.      Pharynx: Oropharynx is clear. No oropharyngeal exudate. Comments: Mucous membranes are tacky but not completely dry  Eyes:      Extraocular Movements: Extraocular movements intact. Conjunctiva/sclera: Conjunctivae normal.      Pupils: Pupils are equal, round, and reactive to light. Cardiovascular:      Rate and Rhythm: Regular rhythm. Tachycardia present. Heart sounds: Normal heart sounds. Pulmonary:      Effort: Pulmonary effort is normal.      Breath sounds: Normal breath sounds. Abdominal:      General: Bowel sounds are normal. There is no distension. Palpations: Abdomen is soft. Tenderness: There is no abdominal tenderness. There is no guarding or rebound. Genitourinary:     Rectum: Normal. Guaiac result negative. Comments: Brown stool, no melena, heme-negative  Musculoskeletal:         General: No tenderness. Normal range of motion. Cervical back: Neck supple. Right lower leg: No edema. Left lower leg: No edema. Lymphadenopathy:      Cervical: No cervical adenopathy. Skin:     General: Skin is warm and dry. Capillary Refill: Capillary refill takes less than 2 seconds. Neurological:      General: No focal deficit present. Mental Status: He is alert and oriented to person, place, and time. Psychiatric:         Mood and Affect: Mood is anxious. Speech: Speech normal.         Behavior: Behavior normal. Behavior is cooperative.          Cognition and Memory: Cognition and memory normal.          MDM  Number of Diagnoses or Management Options  Alcohol withdrawal syndrome, with perceptual disturbance [facility]): new and requires workup  Diagnosis management comments: Hemoglobin is actually increased and concentrated from dehydration. No coffee-ground emesis or bloody emesis in the ED and stool is brown and heme-negative. No sign of alcoholic ketoacidosis. 6:57 AM  Patient is not acidotic and alcohol level is still elevated. I have put an order for Ativan as needed for alcohol withdrawal.  Fever is coming to see the patient after 7 AM and patient will be held in the emergency department until case management can see him to see if we can help arrange detox either at [facility] or the [facility]PEND[facility]. No sign of DTs currently.        Amount and/or Complexity of Data Reviewed  Clinical lab tests: ordered and reviewed (Results for orders placed or performed during the hospital encounter of 07/26/21  -CBC WITH AUTOMATED DIFF       Result                      Value             Ref Range           WBC                         7.6               4.3 - 11.1 K*       RBC                         5.42              4.23 - 5.6 M*       HGB                         17.4 (H)          13.6 - 17.2 *       HCT                         50.0              41.1 - 50.3 %       MCV                         92.3              79.6 - 97.8 *       MCH                         32.1              26.1 - 32.9 *       MCHC                        34.8              31.4 - 35.0 *       RDW                         12.5              11.9 - 14.6 %       PLATELET                    183               150 - 450 K/*       MPV                         9.0 (L)           9.4 - 12.3 FL       ABSOLUTE NRBC               0.00              0.0 - 0.2 K/*       DF                          AUTOMATED                             NEUTROPHILS                 72                43 - 78 %           LYMPHOCYTES                 18 13 - 44 %           MONOCYTES                   8                 4.0 - 12.0 %        EOSINOPHILS                 1                 0.5 - 7.8 %         BASOPHILS                   1                 0.0 - 2.0 %         IMMATURE GRANULOCYTES       0                 0.0 - 5.0 %         ABS. NEUTROPHILS            5.5               1.7 - 8.2 K/*       ABS. LYMPHOCYTES            1.4               0.5 - 4.6 K/*       ABS. MONOCYTES              0.6               0.1 - 1.3 K/*       ABS. EOSINOPHILS            0.1               0.0 - 0.8 K/*       ABS. BASOPHILS              0.1               0.0 - 0.2 K/*       ABS. IMM. GRANS.            0.0               0.0 - 0.5 K/*  -METABOLIC PANEL, COMPREHENSIVE       Result                      Value             Ref Range           Sodium                      140               136 - 145 mm*       Potassium                   3.5               3.5 - 5.1 mm*       Chloride                    96 (L)            98 - 107 mmo*       CO2                         21                21 - 32 mmol*       Anion gap                   23 (H)            7 - 16 mmol/L       Glucose                     98                65 - 100 mg/*       BUN                         11                6 - 23 MG/DL        Creatinine                  0.76 (L)          0.8 - 1.5 MG*       GFR est AA                  >60               >60 ml/min/1*       GFR est non-AA              >60               >60 ml/min/1*       Calcium                     8.5               8.3 - 10.4 M*       Bilirubin, total            0.7               0.2 - 1.1 MG*       ALT (SGPT)                  49                12 - 65 U/L         AST (SGOT)                  75 (H)            15 - 37 U/L         Alk.  phosphatase            55                50 - 136 U/L        Protein, total              8.2               6.3 - 8.2 g/*       Albumin                     4.5               3.5 - 5.0 g/*       Globulin                    3.7 (H) 2.3 - 3.5 g/*       A-G Ratio                   1.2               1.2 - 3.5      -ETHYL ALCOHOL       Result                      Value             Ref Range           ALCOHOL(ETHYL),SERUM        263               MG/DL          -LIPASE       Result                      Value             Ref Range           Lipase                      59 (L)            73 - 393 U/L   -URINALYSIS W/ RFLX MICROSCOPIC       Result                      Value             Ref Range           Color                       YELLOW                                Appearance                  CLEAR                                 Specific gravity            1.022             1.001 - 1.02*       pH (UA)                     5.5               5.0 - 9.0           Protein                     100 (A)           NEG mg/dL           Glucose                     Negative          NEG mg/dL           Ketone                      80 (A)            NEG mg/dL           Bilirubin                   Negative          NEG                 Blood                       TRACE (A)         NEG                 Urobilinogen                0.2               0.2 - 1.0 EU*       Nitrites                    Negative          NEG                 Leukocyte Esterase          Negative          NEG                 WBC                         0-3               0 /hpf              RBC                         0-3               0 /hpf              Epithelial cells            0-3               0 /hpf              Bacteria                    0                 0 /hpf         -TYPE & SCREEN       Result                      Value             Ref Range           Crossmatch Expiration                                         07/29/2021,2359       ABO/Rh(D)                   A NEGATIVE                            Antibody screen             NEG                              )  Tests in the medicine section of CPT®: ordered and reviewed  Decide to obtain previous medical records or to obtain history from someone other than the patient: yes  Review and summarize past medical records: yes    Risk of Complications, Morbidity, and/or Mortality  Presenting problems: high  Diagnostic procedures: moderate  Management options: moderate  General comments: Elements of this note have been dictated via voice recognition software. Text and phrases may be limited by the accuracy of the software. The chart has been reviewed, but errors may still be present.       Patient Progress  Patient progress: improved         Procedures

## 2021-07-26 NOTE — ED NOTES
Called [employer] and they said that if the pt has already been est with a rep from them then there is nothing he can actually do for the pt. [employer] rep stated that since he's been with them before then that means he knows what to do. Rep then ask if pt is actively withdrawing then we will need to contact [facility] CHEM[facility] DEPENDENCY [facility].

## 2021-07-26 NOTE — ED NOTES
Called [facility] per pt request at 0530. Person who answered on call cell stated that there is not enough manpower to send someone out until after 7 am and we should call back then.

## 2021-07-26 NOTE — H&P
[facility]ist History and Physical       Name:  Rachele Herrera  Age:40 y.o. Sex:male   :  1980    MRN:  [id number]   PCP:  Clemente Levi MD      Admit Date:  2021  3:01 AM   Chief Complaint:     Reason for Admission:   Alcohol withdrawal ([facility] [address] [F10.239]    Assessment & Plan:     Alcohol withdrawals:  Admit to inpatient medical bed, start on CIWA protocol based Ativan p.o./IV doses. We will order banana bag infusions x2. Frequent neuro checks for DTs as patient is a high risk. Recheck electrolytes in the morning. Coffee-ground emesis:  Likely secondary to persistent vomiting and retching. His hemoglobin is elevated, trend frequent H&H levels, start Nexium. Consult GI if there is concern for active GI bleeding. Hemoconcentration:  Likely secondary to dehydration. Trend CBC. Disposition/Expected LOS:   Diet: DIET NPO  VTE ppx: SCD due to high risk of GI bleeding. Code status: Full Code      History of Presenting Illness:     Rachele Herrera is a 36 y.o. male with medical history of alcohol abuse, psych disorder, anxiety/depression presented to the ER with concerns of having alcohol withdrawals. Patient reported that he has been binge drinking since Monday and not eaten anything. Today he started to have nausea and multiple episodes of vomiting associated with retching and hiccups. He reports seeing coffee-ground emesis as well. Denied seeing any blood in the vomitus. No rectal bleeding noted but did have an episode of black stool a few days ago. He reports pain in the upper abdomen associated with nausea and vomiting. Denies any fever, shortness of breath, chest pain, back pain, loss of consciousness. No prior history of GI bleed. Alcohol level was 263, Hb was 17.4, lipase was 69. Seen by [facility] representative in the ER. [place] [address] did not have beds available so hospitalist was consulted for admission. Patient is currently having some nausea and vomiting. He is requesting banana bag, IV fluids multivitamins and meds for vomiting. Review of Systems:  A 14 point review of systems was taken and pertinent positive as per HPI. Past Medical History:   Diagnosis Date    Ill-defined condition     ETOH    Psychiatric disorder     anxiety/depression       Past Surgical History:   Procedure Laterality Date    HX WISDOM TEETH EXTRACTION        Family History   Problem Relation Age of Onset    Alcohol abuse Brother         Social History     Tobacco Use    Smoking status: Former Smoker     Quit date: 1/2/2006     Years since quitting: 15.5    Smokeless tobacco: Never Used   Substance Use Topics    Alcohol use: Yes     Alcohol/week: 34.2 standard drinks     Types: 14 Glasses of wine, 6 Cans of beer, 21 Standard drinks or equivalent per week     Comment: daily 2-3        No Known Allergies    Immunization History   Administered Date(s) Administered    Influenza Vaccine [employer]) PF (>6 Mo Flulaval, Fluarix, and >3 [place] [id number]) 01/11/2021    Tdap 08/26/2014         PTA Medications:  Current Outpatient Medications   Medication Instructions    gabapentin (NEURONTIN) 300 mg, Oral, 3 TIMES DAILY    LORazepam (ATIVAN) 0.5 mg, Oral    mirtazapine (REMERON) 45 mg, Oral, EVERY BEDTIME    QUEtiapine (SEROQUEL) 50 mg, Oral, 3 TIMES DAILY    traZODone (DESYREL) 150 mg, Oral, EVERY BEDTIME       Objective:     Patient Vitals for the past 24 hrs:   Temp Pulse Resp BP SpO2   07/26/21 1601 98.9 °F (37.2 °C) 93 20 (!) 150/85 99 %   07/26/21 1252 100.1 °F (37.8 °C) 84 20 121/76 98 %   07/26/21 1228  82 20  95 %   07/26/21 1220  83 30 113/74 99 %   07/26/21 0302 98.2 °F (36.8 °C) (!) 102 18 (!) 145/87 96 %       Oxygen Therapy  O2 Sat (%): 99 % (07/26/21 1601)  Pulse via Oximetry: 84 beats per minute (07/26/21 1228)  O2 Device: None (Room air) (07/26/21 0510)    Body mass index is 18.99 kg/m².     Physical Exam:    General:  Mild distress, speaking in full sentences, weak appearing  HEENT:  Pupils equal and reactive to light and accommodation, oropharynx is clear   Neck:   Supple, no lymphadenopathy, no JVD   Lungs:  Clear to auscultation bilaterally   CV:   Regular rate and rhythm with normal S1 and S2   Abdomen:  Soft, nontender, nondistended, normoactive bowel sounds   Extremities:  No cyanosis clubbing or edema   Neuro:  Nonfocal, A&O x3   Psych:  Very anxious      Data Reviewed: I have reviewed all labs, meds, and studies. Recent Results (from the past 24 hour(s))   URINALYSIS W/ RFLX MICROSCOPIC    Collection Time: 07/26/21  3:11 AM   Result Value Ref Range    Color YELLOW      Appearance CLEAR      Specific gravity 1.022 1.001 - 1.023      pH (UA) 5.5 5.0 - 9.0      Protein 100 (A) NEG mg/dL    Glucose Negative NEG mg/dL    Ketone 80 (A) NEG mg/dL    Bilirubin Negative NEG      Blood TRACE (A) NEG      Urobilinogen 0.2 0.2 - 1.0 EU/dL    Nitrites Negative NEG      Leukocyte Esterase Negative NEG      WBC 0-3 0 /hpf    RBC 0-3 0 /hpf    Epithelial cells 0-3 0 /hpf    Bacteria 0 0 /hpf   CBC WITH AUTOMATED DIFF    Collection Time: 07/26/21  3:12 AM   Result Value Ref Range    WBC 7.6 4.3 - 11.1 K/uL    RBC 5.42 4.23 - 5.6 M/uL    HGB 17.4 (H) 13.6 - 17.2 g/dL    HCT 50.0 41.1 - 50.3 %    MCV 92.3 79.6 - 97.8 FL    MCH 32.1 26.1 - 32.9 PG    MCHC 34.8 31.4 - 35.0 g/dL    RDW 12.5 11.9 - 14.6 %    PLATELET 158 168 - 431 K/uL    MPV 9.0 (L) 9.4 - 12.3 FL    ABSOLUTE NRBC 0.00 0.0 - 0.2 K/uL    DF AUTOMATED      NEUTROPHILS 72 43 - 78 %    LYMPHOCYTES 18 13 - 44 %    MONOCYTES 8 4.0 - 12.0 %    EOSINOPHILS 1 0.5 - 7.8 %    BASOPHILS 1 0.0 - 2.0 %    IMMATURE GRANULOCYTES 0 0.0 - 5.0 %    ABS. NEUTROPHILS 5.5 1.7 - 8.2 K/UL    ABS. LYMPHOCYTES 1.4 0.5 - 4.6 K/UL    ABS. MONOCYTES 0.6 0.1 - 1.3 K/UL    ABS. EOSINOPHILS 0.1 0.0 - 0.8 K/UL    ABS. BASOPHILS 0.1 0.0 - 0.2 K/UL    ABS. IMM.  GRANS. 0.0 0.0 - 0.5 K/UL   METABOLIC PANEL, COMPREHENSIVE    Collection Time: 07/26/21  3:12 AM   Result Value Ref Range    Sodium 140 136 - 145 mmol/L    Potassium 3.5 3.5 - 5.1 mmol/L    Chloride 96 (L) 98 - 107 mmol/L    CO2 21 21 - 32 mmol/L    Anion gap 23 (H) 7 - 16 mmol/L    Glucose 98 65 - 100 mg/dL    BUN 11 6 - 23 MG/DL    Creatinine 0.76 (L) 0.8 - 1.5 MG/DL    GFR est AA >60 >60 ml/min/1.73m2    GFR est non-AA >60 >60 ml/min/1.73m2    Calcium 8.5 8.3 - 10.4 MG/DL    Bilirubin, total 0.7 0.2 - 1.1 MG/DL    ALT (SGPT) 49 12 - 65 U/L    AST (SGOT) 75 (H) 15 - 37 U/L    Alk.  phosphatase 55 50 - 136 U/L    Protein, total 8.2 6.3 - 8.2 g/dL    Albumin 4.5 3.5 - 5.0 g/dL    Globulin 3.7 (H) 2.3 - 3.5 g/dL    A-G Ratio 1.2 1.2 - 3.5     ETHYL ALCOHOL    Collection Time: 07/26/21  3:12 AM   Result Value Ref Range    ALCOHOL(ETHYL),SERUM 263 MG/DL   LIPASE    Collection Time: 07/26/21  3:12 AM   Result Value Ref Range    Lipase 59 (L) 73 - 393 U/L   MAGNESIUM    Collection Time: 07/26/21  3:12 AM   Result Value Ref Range    Magnesium 2.3 1.8 - 2.4 mg/dL   TYPE & SCREEN    Collection Time: 07/26/21  3:56 AM   Result Value Ref Range    Crossmatch Expiration 07/29/2021,2359     ABO/Rh(D) A NEGATIVE     Antibody screen NEG    BLOOD GAS, ARTERIAL POC    Collection Time: 07/26/21  5:44 AM   Result Value Ref Range    FIO2 (POC) 21 %    pH (POC) 7.45 7.35 - 7.45      pCO2 (POC) 27.0 (L) 35 - 45 MMHG    pO2 (POC) 81 75 - 100 MMHG    HCO3 (POC) 18.6 (L) 22 - 26 MMOL/L    sO2 (POC) 96.6 95 - 98 %    Base deficit (POC) 4.0 mmol/L    Allens test (POC) Positive      Site RIGHT RADIAL      Specimen type (POC) ARTERIAL      Performed by Kade Fields    EKG, 12 LEAD, INITIAL    Collection Time: 07/26/21 11:51 AM   Result Value Ref Range    Ventricular Rate 85 BPM    Atrial Rate 85 BPM    P-R Interval 120 ms    QRS Duration 96 ms    Q-T Interval 410 ms    QTC Calculation (Bezet) 487 ms    Calculated P Axis 46 degrees    Calculated R Axis 33 degrees    Calculated T Axis 67 degrees    Diagnosis Normal sinus rhythm  Nonspecific T wave abnormality  Prolonged QT  Abnormal ECG  When compared with ECG of 10-MAR-2020 21:09,  T wave inversion less evident in Anterior leads  QT has lengthened  Confirmed by ST JULIANNE HERCULES MD ()KASIE ([id number]) on 7/26/2021 2:56:08 PM         EKG Results     Procedure 720 Value Units Date/Time    EKG, 12 LEAD, INITIAL [[id number]] Collected: 07/26/21 1151    Order Status: Completed Updated: 07/26/21 1456     Ventricular Rate 85 BPM      Atrial Rate 85 BPM      P-R Interval 120 ms      QRS Duration 96 ms      Q-T Interval 410 ms      QTC Calculation (Bezet) 487 ms      Calculated P Axis 46 degrees      Calculated R Axis 33 degrees      Calculated T Axis 67 degrees      Diagnosis --     Normal sinus rhythm  Nonspecific T wave abnormality  Prolonged QT  Abnormal ECG  When compared with ECG of 10-MAR-2020 21:09,  T wave inversion less evident in Anterior leads  QT has lengthened  Confirmed by ST JULIANNE HERCULES MD ()KASIE ([id number]) on 7/26/2021 2:56:08 PM            All Micro Results     None          Other Studies:  No results found.       Medications:  Medications Administered      Medications Administered     0.9% sodium chloride infusion     Admin Date  07/26/2021 Action  New Bag Dose  150 mL/hr Rate  150 mL/hr Route  IntraVENous Administered By  Mei Centeno RN          0.9% sodium chloride infusion 1,000 mL     Admin Date  07/26/2021 Action  New Bag Dose  1,000 mL Rate  1,000 mL/hr Route  IntraVENous Administered By  Mei Centeno RN          LORazepam (ATIVAN) injection 1 mg     Admin Date  07/26/2021 Action  Given Dose  1 mg Route  IntraVENous Administered By  Mei Centeno RN           Admin Date  07/26/2021 Action  Given Dose  1 mg Route  IntraVENous Administered By  Deon Booth RN          ondansetron [facility]) injection 4 mg     Admin Date  07/26/2021 Action  Given Dose  4 mg Route  IntraVENous Administered By  Mei Centeno RN Admin Date  07/26/2021 Action  Given Dose  4 mg Route  IntraVENous Administered By  Poly Sears RN          sodium chloride 0.9 % bolus infusion 1,000 mL     Admin Date  07/26/2021 Action  New Bag Dose  1,000 mL Rate  1,000 mL/hr Route  IntraVENous Administered By  Alicia Obrien, BRAYDON                      Problem List:     Hospital Problems as of 7/26/2021 Never Reviewed        Codes Class Noted - Resolved POA    Nausea and vomiting ICD-10-CM: R11.2  ICD-9-CM: 787.01  1/10/2021 - Present Yes        Anxiety ICD-10-CM: F41.9  ICD-9-CM: 300.00  5/8/2020 - Present Yes        Depression ICD-10-CM: F32.9  ICD-9-CM: 323  5/8/2020 - Present Yes        * (Principal) [facility] ICD-10-CM: F51.281  ICD-9-CM: 291.81  6/19/2019 - Present Yes                 Signed By: Emmanuel Emerson MD   [facility] Hospitalist Service    July 26, 2021  4:22 PM

## 2021-07-26 NOTE — PROGRESS NOTES
TRANSFER - IN REPORT:    Verbal report received from [facility] (name) on Ryan Delarosa  being received from ER (unit) for routine progression of care      Report consisted of patients Situation, Background, Assessment and   Recommendations(SBAR). Information from the following report(s) SBAR, Kardex, Intake/Output, MAR and Recent Results was reviewed with the receiving nurse. Opportunity for questions and clarification was provided. Assessment completed upon patients arrival to unit and care assumed.

## 2021-07-27 VITALS
SYSTOLIC BLOOD PRESSURE: 135 MMHG | DIASTOLIC BLOOD PRESSURE: 85 MMHG | BODY MASS INDEX: 18.96 KG/M2 | RESPIRATION RATE: 18 BRPM | OXYGEN SATURATION: 98 % | WEIGHT: 139.99 LBS | HEIGHT: 72 IN | TEMPERATURE: 98.7 F | HEART RATE: 80 BPM

## 2021-07-27 LAB
ANION GAP SERPL CALC-SCNC: 9 MMOL/L (ref 7–16)
BUN SERPL-MCNC: 8 MG/DL (ref 6–23)
CALCIUM SERPL-MCNC: 7.3 MG/DL (ref 8.3–10.4)
CHLORIDE SERPL-SCNC: 108 MMOL/L (ref 98–107)
CO2 SERPL-SCNC: 27 MMOL/L (ref 21–32)
CREAT SERPL-MCNC: 0.6 MG/DL (ref 0.8–1.5)
ERYTHROCYTE [DISTWIDTH] IN BLOOD BY AUTOMATED COUNT: 12.3 % (ref 11.9–14.6)
GLUCOSE SERPL-MCNC: 79 MG/DL (ref 65–100)
HCT VFR BLD AUTO: 42.1 % (ref 41.1–50.3)
HGB BLD-MCNC: 14.8 G/DL (ref 13.6–17.2)
MAGNESIUM SERPL-MCNC: 2.1 MG/DL (ref 1.8–2.4)
MCH RBC QN AUTO: 32.3 PG (ref 26.1–32.9)
MCHC RBC AUTO-ENTMCNC: 35.2 G/DL (ref 31.4–35)
MCV RBC AUTO: 91.9 FL (ref 79.6–97.8)
NRBC # BLD: 0 K/UL (ref 0–0.2)
PHOSPHATE SERPL-MCNC: 1.5 MG/DL (ref 2.5–4.5)
PLATELET # BLD AUTO: 106 K/UL (ref 150–450)
PMV BLD AUTO: 9.4 FL (ref 9.4–12.3)
POTASSIUM SERPL-SCNC: 3.5 MMOL/L (ref 3.5–5.1)
RBC # BLD AUTO: 4.58 M/UL (ref 4.23–5.6)
SODIUM SERPL-SCNC: 144 MMOL/L (ref 136–145)
WBC # BLD AUTO: 4 K/UL (ref 4.3–11.1)

## 2021-07-27 PROCEDURE — 80048 BASIC METABOLIC PNL TOTAL CA: CPT

## 2021-07-27 PROCEDURE — 84100 ASSAY OF PHOSPHORUS: CPT

## 2021-07-27 PROCEDURE — 74011250637 HC RX REV CODE- 250/637: Performed by: NURSE PRACTITIONER

## 2021-07-27 PROCEDURE — 85027 COMPLETE CBC AUTOMATED: CPT

## 2021-07-27 PROCEDURE — 74011250637 HC RX REV CODE- 250/637: Performed by: INTERNAL MEDICINE

## 2021-07-27 PROCEDURE — 36415 COLL VENOUS BLD VENIPUNCTURE: CPT

## 2021-07-27 PROCEDURE — 83735 ASSAY OF MAGNESIUM: CPT

## 2021-07-27 RX ORDER — CHLORDIAZEPOXIDE HYDROCHLORIDE 10 MG/1
10 CAPSULE, GELATIN COATED ORAL 3 TIMES DAILY
Qty: 6 CAPSULE | Refills: 0 | Status: SHIPPED | OUTPATIENT
Start: 2021-07-27 | End: 2021-07-29

## 2021-07-27 RX ORDER — CHLORDIAZEPOXIDE HYDROCHLORIDE 10 MG/1
10 CAPSULE, GELATIN COATED ORAL 4 TIMES DAILY
Status: DISCONTINUED | OUTPATIENT
Start: 2021-07-27 | End: 2021-07-27 | Stop reason: HOSPADM

## 2021-07-27 RX ORDER — ONDANSETRON 4 MG/1
4 TABLET, ORALLY DISINTEGRATING ORAL
Qty: 15 TABLET | Refills: 0 | Status: SHIPPED | OUTPATIENT
Start: 2021-07-27 | End: 2021-08-01

## 2021-07-27 RX ADMIN — CHLORDIAZEPOXIDE HYDROCHLORIDE 10 MG: 10 CAPSULE ORAL at 08:31

## 2021-07-27 RX ADMIN — GABAPENTIN 300 MG: 300 CAPSULE ORAL at 08:31

## 2021-07-27 RX ADMIN — Medication 10 ML: at 06:39

## 2021-07-27 RX ADMIN — QUETIAPINE FUMARATE 100 MG: 100 TABLET ORAL at 08:32

## 2021-07-27 NOTE — PROGRESS NOTES
Am assessment completed. Pt is alert and oriented x 4, lungs clear, breathing non-labored. Bowel sounds + q 4. Continent of bowel and bladder. Verbalizes needs well. Suppose to go to ETOH rehab today.

## 2021-07-27 NOTE — DISCHARGE SUMMARY
[facility] [place] [facility] Hospitalist Discharge Summary     Name:  Silviano Corado    Age:40 y.o. Sex:male  :  1980       MRN:  [id number]       Admitting Physician: Blas Bailey MD Admit Date: 2021  3:01 AM   Attending Physician: Jax Alexis MD  Primary Care Physician: Rudolph Mckinney MD       Discharge Physician: Nita Goldberg NP  Discharge date: 21   Discharged Condition: Stable    Indication for Admission:   Chief Complaint   Patient presents with    Alcohol Problem        Reasons for hospitalization:  Hospital Problems as of 2021 Never Reviewed        Codes Class Noted - Resolved POA    Nausea and vomiting ICD-10-CM: R11.2  ICD-9-CM: 787.01  1/10/2021 - Present Yes        Anxiety ICD-10-CM: F41.9  ICD-9-CM: 300.00  2020 - Present Yes        Depression ICD-10-CM: F32.9  ICD-9-CM: 470  2020 - Present Yes        * (Principal) [facility] ([facility] ICD-10-CM: F10.239  ICD-9-CM: 291.81  2019 - Present Yes                 Discharge Diagnosis: ETOH use, Coffee-ground emesis-resolved  Did Patient have Sepsis (YES OR NO): No       Hospital Course/Interval history:  36 y.o. male with medical history of alcohol abuse, psych disorder, anxiety/depression presented to the ER with concerns of having alcohol withdrawals. Patient reported that he has been binge drinking since Monday and not eaten anything. Today he started to have nausea and multiple episodes of vomiting associated with retching and hiccups. He reports seeing coffee-ground emesis as well. Denied seeing any blood in the vomitus. No rectal bleeding noted but did have an episode of black stool a few days ago. He reports pain in the upper abdomen associated with nausea and vomiting. Denies any fever, shortness of breath, chest pain, back pain, loss of consciousness. No prior history of GI bleed. Alcohol level was 263, Hb was 17.4, lipase was 69. Seen by [facility] representative in the ER.   [place] [address] did not have beds available so hospitalist was consulted for admission. On day of discharge he is alert and oriented x3. No tremors, calm. He states last intake of ETOH Friday (4 days ago). He will discharge home (does not [facility] Hemoglobin 14.8, no further emesis. Assessment/Plan:  Alcohol withdrawals:  Admit to inpatient medical bed, start on CIWA protocol based Ativan p.o./IV doses. We will order banana bag infusions x2. Frequent neuro checks for DTs as patient is a high risk. Recheck electrolytes in the morning.     Coffee-ground emesis:  Likely secondary to persistent vomiting and retching. His hemoglobin is elevated, trend frequent H&H levels, start Nexium. Consult GI if there is concern for active GI bleeding. Hypophosphatemia:  1.5  Related to ETOH use  Supplementation ordered    Consults:   None     Disposition: Home or Self Care  Diet:   DIET ADULT  Code Status: Full Code        Current Discharge Medication List      START taking these medications    Details   chlordiazePOXIDE (LIBRIUM) 10 mg capsule Take 1 Capsule by mouth three (3) times daily for 2 days. Max Daily Amount: 30 mg.  Qty: 6 Capsule, Refills: 0  Start date: 7/27/2021, End date: 7/29/2021    Associated Diagnoses: Alcohol withdrawal syndrome, with perceptual disturbance (HCC)      phosphorus (K PHOS NEUTRAL) 250 mg tablet Take 1 Tablet by mouth two (2) times a day for 30 days. Qty: 60 Tablet, Refills: 0  Start date: 7/27/2021, End date: 8/26/2021         CONTINUE these medications which have CHANGED    Details   ondansetron (ZOFRAN ODT) 4 mg disintegrating tablet Take 1 Tablet by mouth every eight (8) hours as needed for Nausea or Vomiting for up to 5 days. Qty: 15 Tablet, Refills: 0  Start date: 7/27/2021, End date: 8/1/2021         CONTINUE these medications which have NOT CHANGED    Details   QUEtiapine (SEROqueL) 50 mg tablet Take 50 mg by mouth three (3) times daily.  Indications: repeated episodes of anxiety mirtazapine (Remeron) 45 mg tablet Take 45 mg by mouth nightly.      gabapentin (NEURONTIN) 300 mg capsule Take 300 mg by mouth three (3) times daily. Indications: alcoholism      traZODone (DESYREL) 150 mg tablet Take 150 mg by mouth nightly. Indications: PT. NEEDS NEW PRESCRIPTION AT D/C. STOP taking these medications       LORazepam (Ativan) 0.5 mg tablet Comments:   Reason for Stopping:               Medications Discontinued During This Encounter   Medication Reason    ondansetron (ZOFRAN ODT) 8 mg disintegrating tablet Therapy Completed    gabapentin (NEURONTIN) capsule 300 mg     LORazepam (ATIVAN) injection 1 mg     0.9% sodium chloride infusion     QUEtiapine (SEROquel) tablet 50 mg DOSE ADJUSTMENT         Follow Up Orders:   Follow-up Appointments   Procedures    FOLLOW UP VISIT Appointment in: 3 - 5 Days PCP     PCP     Standing Status:   Standing     Number of Occurrences:   1     Order Specific Question:   Appointment in     Answer:   3 - 5 Days         Follow-up Information     Follow up With Specialties Details Why Contact Info    Meche Springer MD Family [address]  [phone]              Discharge Exam:    Patient Vitals for the past 24 hrs:   Temp Pulse Resp BP SpO2   07/27/21 0730 98.7 °F (37.1 °C) 80 18 135/85 98 %   07/27/21 0437 98.3 °F (36.8 °C) 66 18 134/89 99 %   07/27/21 0022 98.5 °F (36.9 °C) 89 20 128/84 99 %   07/26/21 1950 97.6 °F (36.4 °C) 95 20 130/81 99 %   07/26/21 1601 98.9 °F (37.2 °C) 93 20 (!) 150/85 99 %   07/26/21 1252 100.1 °F (37.8 °C) 84 20 121/76 98 %   07/26/21 1228  82 20  95 %   07/26/21 1220  83 30 113/74 99 %     Oxygen Therapy  O2 Sat (%): 98 % (07/27/21 0730)  Pulse via Oximetry: 84 beats per minute (07/26/21 1228)  O2 Device: None (Room air) (07/27/21 0000)    Estimated body mass index is 18.99 kg/m² as calculated from the following:    Height as of this encounter: 6' (1.829 m). Weight as of this encounter: 63.5 kg (139 lb 15.9 oz). Intake/Output Summary (Last 24 hours) at 7/27/2021 0905  Last data filed at 7/26/2021 2220  Gross per 24 hour   Intake 1168 ml   Output 900 ml   Net 268 ml       *Note that automatically entered I/Os may not be accurate; dependent on patient compliance with collection and accurate  by assistants.     Physical Exam:   General:  No acute distress, speaking in full sentences, no use of accessory muscles   HEENT:  Pupils equal and reactive to light and accommodation, oropharynx is clear   Neck:   Supple, no lymphadenopathy, no JVD   Lungs:  Clear to auscultation bilaterally   CV:  Regular rate and rhythm with normal S1 and S2   Abdomen: Soft, nontender, nondistended, normoactive bowel sounds   Extremities:  No cyanosis clubbing or edema   Neuro:  Nonfocal, A&O x3   Psych:  Normal affect       All Labs from Last 24 Hrs:  Recent Results (from the past 24 hour(s))   EKG, 12 LEAD, INITIAL    Collection Time: 07/26/21 11:51 AM   Result Value Ref Range    Ventricular Rate 85 BPM    Atrial Rate 85 BPM    P-R Interval 120 ms    QRS Duration 96 ms    Q-T Interval 410 ms    QTC Calculation (Bezet) 487 ms    Calculated P Axis 46 degrees    Calculated R Axis 33 degrees    Calculated T Axis 67 degrees    Diagnosis       Normal sinus rhythm  Nonspecific T wave abnormality  Prolonged QT  Abnormal ECG  When compared with ECG of 10-MAR-2020 21:09,  T wave inversion less evident in Anterior leads  QT has lengthened  Confirmed by ST JULIANNE HERCULES MD (), KASIE WHITE ([id number]) on 7/26/2021 2:56:08 PM     HGB & HCT    Collection Time: 07/26/21  7:04 PM   Result Value Ref Range    HGB 14.8 13.6 - 17.2 g/dL    HCT 42.5 41.1 - 87.0 %   METABOLIC PANEL, BASIC    Collection Time: 07/27/21  5:40 AM   Result Value Ref Range    Sodium 144 136 - 145 mmol/L    Potassium 3.5 3.5 - 5.1 mmol/L    Chloride 108 (H) 98 - 107 mmol/L    CO2 27 21 - 32 mmol/L    Anion gap 9 7 - 16 mmol/L Glucose 79 65 - 100 mg/dL    BUN 8 6 - 23 MG/DL    Creatinine 0.60 (L) 0.8 - 1.5 MG/DL    GFR est AA >60 >60 ml/min/1.73m2    GFR est non-AA >60 >60 ml/min/1.73m2    Calcium 7.3 (L) 8.3 - 10.4 MG/DL   MAGNESIUM    Collection Time: 07/27/21  5:40 AM   Result Value Ref Range    Magnesium 2.1 1.8 - 2.4 mg/dL   CBC W/O DIFF    Collection Time: 07/27/21  5:40 AM   Result Value Ref Range    WBC 4.0 (L) 4.3 - 11.1 K/uL    RBC 4.58 4.23 - 5.6 M/uL    HGB 14.8 13.6 - 17.2 g/dL    HCT 42.1 41.1 - 50.3 %    MCV 91.9 79.6 - 97.8 FL    MCH 32.3 26.1 - 32.9 PG    MCHC 35.2 (H) 31.4 - 35.0 g/dL    RDW 12.3 11.9 - 14.6 %    PLATELET 056 (L) 354 - 450 K/uL    MPV 9.4 9.4 - 12.3 FL    ABSOLUTE NRBC 0.00 0.0 - 0.2 K/uL   PHOSPHORUS    Collection Time: 07/27/21  5:40 AM   Result Value Ref Range    Phosphorus 1.5 (L) 2.5 - 4.5 MG/DL       All Micro Results     None          SARS-CoV-2 Lab Results  \"Novel Coronavirus\" Test: No results found for: COV2NT   \"Emergent Disease\" Test: No results found for: EDPR  \"SARS-COV-2\" Test: No results found for: XGCOVT  Rapid Test:   No results found for: COVR         Diagnostic Imaging/Tests:   No results found. Echocardiogram/EKG results:  No results found for this visit on 07/26/21.     EKG Results     Procedure 720 Value Units Date/Time    EKG, 12 LEAD, INITIAL [[id number]] Collected: 07/26/21 1151    Order Status: Completed Updated: 07/26/21 1456     Ventricular Rate 85 BPM      Atrial Rate 85 BPM      P-R Interval 120 ms      QRS Duration 96 ms      Q-T Interval 410 ms      QTC Calculation (Bezet) 487 ms      Calculated P Axis 46 degrees      Calculated R Axis 33 degrees      Calculated T Axis 67 degrees      Diagnosis --     Normal sinus rhythm  Nonspecific T wave abnormality  Prolonged QT  Abnormal ECG  When compared with ECG of 10-MAR-2020 21:09,  T wave inversion less evident in Anterior leads  QT has lengthened  Confirmed by ST JULIANNE HERCULES MD (), KASIE WHITE ([id number]) on 7/26/2021 2:56:08 PM Results for orders placed or performed during the hospital encounter of 07/26/21   EKG, 12 LEAD, INITIAL   Result Value Ref Range    Ventricular Rate 85 BPM    Atrial Rate 85 BPM    P-R Interval 120 ms    QRS Duration 96 ms    Q-T Interval 410 ms    QTC Calculation (Bezet) 487 ms    Calculated P Axis 46 degrees    Calculated R Axis 33 degrees    Calculated T Axis 67 degrees    Diagnosis       Normal sinus rhythm  Nonspecific T wave abnormality  Prolonged QT  Abnormal ECG  When compared with ECG of 10-MAR-2020 21:09,  T wave inversion less evident in Anterior leads  QT has lengthened  Confirmed by ST JULIANNE HERCULES MD (), KASIE WHITE ([id number]) on 7/26/2021 2:56:08 PM         Procedures done this admission:  * No surgery found *        Time spent in patient discharge planning and coordination 40 minutes.       Signed By: Vickie Valdez NP   [facility] Hospitalist Service    July 27, 2021  8:57 AM

## 2021-07-27 NOTE — PROGRESS NOTES
Pt took own IV out laid on bed, changed clothes, and left without discharge instructions, eating or Rxs.